# Patient Record
Sex: FEMALE | Race: WHITE | NOT HISPANIC OR LATINO | URBAN - METROPOLITAN AREA
[De-identification: names, ages, dates, MRNs, and addresses within clinical notes are randomized per-mention and may not be internally consistent; named-entity substitution may affect disease eponyms.]

---

## 2023-07-31 ENCOUNTER — EMERGENCY (EMERGENCY)
Facility: HOSPITAL | Age: 83
LOS: 1 days | Discharge: HOME | End: 2023-07-31
Attending: EMERGENCY MEDICINE | Admitting: STUDENT IN AN ORGANIZED HEALTH CARE EDUCATION/TRAINING PROGRAM
Payer: MEDICARE

## 2023-07-31 VITALS
HEART RATE: 158 BPM | OXYGEN SATURATION: 100 % | RESPIRATION RATE: 18 BRPM | DIASTOLIC BLOOD PRESSURE: 107 MMHG | SYSTOLIC BLOOD PRESSURE: 172 MMHG

## 2023-07-31 DIAGNOSIS — K86.81 EXOCRINE PANCREATIC INSUFFICIENCY: ICD-10-CM

## 2023-07-31 DIAGNOSIS — Z98.890 OTHER SPECIFIED POSTPROCEDURAL STATES: Chronic | ICD-10-CM

## 2023-07-31 DIAGNOSIS — I48.91 UNSPECIFIED ATRIAL FIBRILLATION: ICD-10-CM

## 2023-07-31 DIAGNOSIS — I10 ESSENTIAL (PRIMARY) HYPERTENSION: ICD-10-CM

## 2023-07-31 DIAGNOSIS — S72.009A FRACTURE OF UNSPECIFIED PART OF NECK OF UNSPECIFIED FEMUR, INITIAL ENCOUNTER FOR CLOSED FRACTURE: ICD-10-CM

## 2023-07-31 DIAGNOSIS — Z96.651 PRESENCE OF RIGHT ARTIFICIAL KNEE JOINT: Chronic | ICD-10-CM

## 2023-07-31 DIAGNOSIS — Z29.9 ENCOUNTER FOR PROPHYLACTIC MEASURES, UNSPECIFIED: ICD-10-CM

## 2023-07-31 DIAGNOSIS — I47.1 SUPRAVENTRICULAR TACHYCARDIA: ICD-10-CM

## 2023-07-31 DIAGNOSIS — I48.92 UNSPECIFIED ATRIAL FLUTTER: ICD-10-CM

## 2023-07-31 DIAGNOSIS — E78.5 HYPERLIPIDEMIA, UNSPECIFIED: ICD-10-CM

## 2023-07-31 DIAGNOSIS — Z90.12 ACQUIRED ABSENCE OF LEFT BREAST AND NIPPLE: Chronic | ICD-10-CM

## 2023-07-31 LAB
ALBUMIN SERPL ELPH-MCNC: 3.9 G/DL — SIGNIFICANT CHANGE UP (ref 3.3–5)
ALP SERPL-CCNC: 53 U/L — SIGNIFICANT CHANGE UP (ref 40–120)
ALT FLD-CCNC: 16 U/L — SIGNIFICANT CHANGE UP (ref 10–45)
ANION GAP SERPL CALC-SCNC: 17 MMOL/L — SIGNIFICANT CHANGE UP (ref 5–17)
APTT BLD: 30.4 SEC — SIGNIFICANT CHANGE UP (ref 24.5–35.6)
AST SERPL-CCNC: 25 U/L — SIGNIFICANT CHANGE UP (ref 10–40)
BASOPHILS # BLD AUTO: 0.02 K/UL — SIGNIFICANT CHANGE UP (ref 0–0.2)
BASOPHILS NFR BLD AUTO: 0.3 % — SIGNIFICANT CHANGE UP (ref 0–2)
BILIRUB SERPL-MCNC: 0.3 MG/DL — SIGNIFICANT CHANGE UP (ref 0.2–1.2)
BUN SERPL-MCNC: 24 MG/DL — HIGH (ref 7–23)
CALCIUM SERPL-MCNC: 9.1 MG/DL — SIGNIFICANT CHANGE UP (ref 8.4–10.5)
CHLORIDE SERPL-SCNC: 102 MMOL/L — SIGNIFICANT CHANGE UP (ref 96–108)
CO2 SERPL-SCNC: 16 MMOL/L — LOW (ref 22–31)
CREAT SERPL-MCNC: 1.08 MG/DL — SIGNIFICANT CHANGE UP (ref 0.5–1.3)
EGFR: 52 ML/MIN/1.73M2 — LOW
EOSINOPHIL # BLD AUTO: 0.01 K/UL — SIGNIFICANT CHANGE UP (ref 0–0.5)
EOSINOPHIL NFR BLD AUTO: 0.2 % — SIGNIFICANT CHANGE UP (ref 0–6)
GLUCOSE SERPL-MCNC: 174 MG/DL — HIGH (ref 70–99)
HCT VFR BLD CALC: 36.6 % — SIGNIFICANT CHANGE UP (ref 34.5–45)
HGB BLD-MCNC: 12 G/DL — SIGNIFICANT CHANGE UP (ref 11.5–15.5)
IMM GRANULOCYTES NFR BLD AUTO: 0.5 % — SIGNIFICANT CHANGE UP (ref 0–0.9)
INR BLD: 0.99 RATIO — SIGNIFICANT CHANGE UP (ref 0.85–1.18)
LYMPHOCYTES # BLD AUTO: 1.29 K/UL — SIGNIFICANT CHANGE UP (ref 1–3.3)
LYMPHOCYTES # BLD AUTO: 21.9 % — SIGNIFICANT CHANGE UP (ref 13–44)
MAGNESIUM SERPL-MCNC: 1.3 MG/DL — LOW (ref 1.6–2.6)
MCHC RBC-ENTMCNC: 30.2 PG — SIGNIFICANT CHANGE UP (ref 27–34)
MCHC RBC-ENTMCNC: 32.8 GM/DL — SIGNIFICANT CHANGE UP (ref 32–36)
MCV RBC AUTO: 92.2 FL — SIGNIFICANT CHANGE UP (ref 80–100)
MONOCYTES # BLD AUTO: 0.68 K/UL — SIGNIFICANT CHANGE UP (ref 0–0.9)
MONOCYTES NFR BLD AUTO: 11.5 % — SIGNIFICANT CHANGE UP (ref 2–14)
NEUTROPHILS # BLD AUTO: 3.86 K/UL — SIGNIFICANT CHANGE UP (ref 1.8–7.4)
NEUTROPHILS NFR BLD AUTO: 65.6 % — SIGNIFICANT CHANGE UP (ref 43–77)
NRBC # BLD: 0 /100 WBCS — SIGNIFICANT CHANGE UP (ref 0–0)
NT-PROBNP SERPL-SCNC: 1388 PG/ML — HIGH (ref 0–300)
PHOSPHATE SERPL-MCNC: 2.1 MG/DL — LOW (ref 2.5–4.5)
PLATELET # BLD AUTO: 228 K/UL — SIGNIFICANT CHANGE UP (ref 150–400)
POTASSIUM SERPL-MCNC: 4.1 MMOL/L — SIGNIFICANT CHANGE UP (ref 3.5–5.3)
POTASSIUM SERPL-SCNC: 4.1 MMOL/L — SIGNIFICANT CHANGE UP (ref 3.5–5.3)
PROT SERPL-MCNC: 6.9 G/DL — SIGNIFICANT CHANGE UP (ref 6–8.3)
PROTHROM AB SERPL-ACNC: 10.9 SEC — SIGNIFICANT CHANGE UP (ref 9.5–13)
RBC # BLD: 3.97 M/UL — SIGNIFICANT CHANGE UP (ref 3.8–5.2)
RBC # FLD: 13.1 % — SIGNIFICANT CHANGE UP (ref 10.3–14.5)
SODIUM SERPL-SCNC: 135 MMOL/L — SIGNIFICANT CHANGE UP (ref 135–145)
TROPONIN T, HIGH SENSITIVITY RESULT: 21 NG/L — SIGNIFICANT CHANGE UP (ref 0–51)
WBC # BLD: 5.89 K/UL — SIGNIFICANT CHANGE UP (ref 3.8–10.5)
WBC # FLD AUTO: 5.89 K/UL — SIGNIFICANT CHANGE UP (ref 3.8–10.5)

## 2023-07-31 RX ORDER — HEPARIN SODIUM 5000 [USP'U]/ML
6500 INJECTION INTRAVENOUS; SUBCUTANEOUS EVERY 6 HOURS
Refills: 0 | Status: DISCONTINUED | OUTPATIENT
Start: 2023-07-31 | End: 2023-08-01

## 2023-07-31 RX ORDER — LANOLIN ALCOHOL/MO/W.PET/CERES
3 CREAM (GRAM) TOPICAL AT BEDTIME
Refills: 0 | Status: DISCONTINUED | OUTPATIENT
Start: 2023-07-31 | End: 2023-08-01

## 2023-07-31 RX ORDER — METOPROLOL TARTRATE 50 MG
25 TABLET ORAL ONCE
Refills: 0 | Status: COMPLETED | OUTPATIENT
Start: 2023-07-31 | End: 2023-07-31

## 2023-07-31 RX ORDER — PANTOPRAZOLE SODIUM 20 MG/1
40 TABLET, DELAYED RELEASE ORAL
Refills: 0 | Status: DISCONTINUED | OUTPATIENT
Start: 2023-07-31 | End: 2023-08-01

## 2023-07-31 RX ORDER — MAGNESIUM SULFATE 500 MG/ML
2 VIAL (ML) INJECTION ONCE
Refills: 0 | Status: COMPLETED | OUTPATIENT
Start: 2023-07-31 | End: 2023-07-31

## 2023-07-31 RX ORDER — ONDANSETRON 8 MG/1
4 TABLET, FILM COATED ORAL EVERY 8 HOURS
Refills: 0 | Status: DISCONTINUED | OUTPATIENT
Start: 2023-07-31 | End: 2023-08-01

## 2023-07-31 RX ORDER — LIPASE/PROTEASE/AMYLASE 16-48-48K
1 CAPSULE,DELAYED RELEASE (ENTERIC COATED) ORAL
Refills: 0 | Status: DISCONTINUED | OUTPATIENT
Start: 2023-07-31 | End: 2023-08-01

## 2023-07-31 RX ORDER — ATORVASTATIN CALCIUM 80 MG/1
20 TABLET, FILM COATED ORAL AT BEDTIME
Refills: 0 | Status: DISCONTINUED | OUTPATIENT
Start: 2023-07-31 | End: 2023-08-01

## 2023-07-31 RX ORDER — METOPROLOL TARTRATE 50 MG
5 TABLET ORAL ONCE
Refills: 0 | Status: COMPLETED | OUTPATIENT
Start: 2023-07-31 | End: 2023-07-31

## 2023-07-31 RX ORDER — ACETAMINOPHEN 500 MG
650 TABLET ORAL EVERY 6 HOURS
Refills: 0 | Status: DISCONTINUED | OUTPATIENT
Start: 2023-07-31 | End: 2023-08-01

## 2023-07-31 RX ORDER — LISINOPRIL 2.5 MG/1
10 TABLET ORAL DAILY
Refills: 0 | Status: DISCONTINUED | OUTPATIENT
Start: 2023-07-31 | End: 2023-08-01

## 2023-07-31 RX ORDER — ASPIRIN/CALCIUM CARB/MAGNESIUM 324 MG
81 TABLET ORAL DAILY
Refills: 0 | Status: DISCONTINUED | OUTPATIENT
Start: 2023-07-31 | End: 2023-08-01

## 2023-07-31 RX ORDER — METOPROLOL TARTRATE 50 MG
50 TABLET ORAL DAILY
Refills: 0 | Status: DISCONTINUED | OUTPATIENT
Start: 2023-07-31 | End: 2023-08-01

## 2023-07-31 RX ORDER — HEPARIN SODIUM 5000 [USP'U]/ML
6500 INJECTION INTRAVENOUS; SUBCUTANEOUS ONCE
Refills: 0 | Status: COMPLETED | OUTPATIENT
Start: 2023-07-31 | End: 2023-07-31

## 2023-07-31 RX ORDER — SODIUM CHLORIDE 9 MG/ML
1000 INJECTION INTRAMUSCULAR; INTRAVENOUS; SUBCUTANEOUS
Refills: 0 | Status: DISCONTINUED | OUTPATIENT
Start: 2023-07-31 | End: 2023-08-01

## 2023-07-31 RX ORDER — ESCITALOPRAM OXALATE 10 MG/1
10 TABLET, FILM COATED ORAL DAILY
Refills: 0 | Status: DISCONTINUED | OUTPATIENT
Start: 2023-07-31 | End: 2023-08-01

## 2023-07-31 RX ORDER — HEPARIN SODIUM 5000 [USP'U]/ML
INJECTION INTRAVENOUS; SUBCUTANEOUS
Qty: 25000 | Refills: 0 | Status: DISCONTINUED | OUTPATIENT
Start: 2023-07-31 | End: 2023-08-01

## 2023-07-31 RX ORDER — SENNA PLUS 8.6 MG/1
2 TABLET ORAL AT BEDTIME
Refills: 0 | Status: DISCONTINUED | OUTPATIENT
Start: 2023-07-31 | End: 2023-08-01

## 2023-07-31 RX ORDER — HEPARIN SODIUM 5000 [USP'U]/ML
3000 INJECTION INTRAVENOUS; SUBCUTANEOUS EVERY 6 HOURS
Refills: 0 | Status: DISCONTINUED | OUTPATIENT
Start: 2023-07-31 | End: 2023-08-01

## 2023-07-31 RX ADMIN — Medication 25 GRAM(S): at 20:11

## 2023-07-31 RX ADMIN — HEPARIN SODIUM 1500 UNIT(S)/HR: 5000 INJECTION INTRAVENOUS; SUBCUTANEOUS at 20:10

## 2023-07-31 RX ADMIN — Medication 50 MILLIGRAM(S): at 23:43

## 2023-07-31 RX ADMIN — Medication 5 MILLIGRAM(S): at 18:25

## 2023-07-31 RX ADMIN — ATORVASTATIN CALCIUM 20 MILLIGRAM(S): 80 TABLET, FILM COATED ORAL at 23:43

## 2023-07-31 RX ADMIN — HEPARIN SODIUM 6500 UNIT(S): 5000 INJECTION INTRAVENOUS; SUBCUTANEOUS at 20:09

## 2023-07-31 RX ADMIN — Medication 85 MILLIMOLE(S): at 22:15

## 2023-07-31 RX ADMIN — Medication 25 MILLIGRAM(S): at 18:50

## 2023-07-31 NOTE — ED ADULT NURSE REASSESSMENT NOTE - NS ED NURSE REASSESS COMMENT FT1
Report received from previous RN. Pt is A&Ox4, follows commands, breathing spontaneous and unlabored on RA, NSR on cardiac monitor. Pt denies SOB, palpitations, cp, abd pain, N&v, weakness, dizziness, and HA. No complaints to endorse. Pt awaiting anticoagulation medication. Comfort and safety measures in place.

## 2023-07-31 NOTE — ED PROVIDER NOTE - DIFFERENTIAL DIAGNOSIS
Ddx includes, however, is not limited to: aflutter, afib, dehydration, electrolyte abnormality, ACS, other Differential Diagnosis

## 2023-07-31 NOTE — ED ADULT NURSE NOTE - NSFALLUNIVINTERV_ED_ALL_ED
Bed/Stretcher in lowest position, wheels locked, appropriate side rails in place/Call bell, personal items and telephone in reach/Instruct patient to call for assistance before getting out of bed/chair/stretcher/Non-slip footwear applied when patient is off stretcher/[place] to call system/Physically safe environment - no spills, clutter or unnecessary equipment/Purposeful proactive rounding/Room/bathroom lighting operational, light cord in reach

## 2023-07-31 NOTE — ED ADULT NURSE NOTE - OBJECTIVE STATEMENT
82y female, AAOx4, PMH HTN, on daily asprin, pt reports not feeling well x 2 days, some mild diarrhea, poor PO, EMS found pt to be SVT in 170s, gave pt 700 cc bolus, pt denies chest pain, shortness of breath, abd pain, n/v/d, moves all extremities w/+ pulses, 18g right ac by ems, 20g right wrist, pt ind at baseline, domiciled with sister, pt on cm, placed in gown, side rails up, comfort and safety provided.

## 2023-07-31 NOTE — H&P ADULT - NSHPPHYSICALEXAM_GEN_ALL_CORE
T(C): 37.1 (07-31-23 @ 19:30), Max: 37.1 (07-31-23 @ 19:30)  HR: 69 (07-31-23 @ 19:30) (69 - 158)  BP: 137/77 (07-31-23 @ 19:30) (137/77 - 172/107)  RR: 18 (07-31-23 @ 19:30) (17 - 18)  SpO2: 100% (07-31-23 @ 19:30) (98% - 100%)    CONSTITUTIONAL: Well groomed, no apparent distress  EYES: PERRLA and symmetric, EOMI  ENMT: MMM. Normal dentition  RESP: No respiratory distress, no use of accessory muscles; CTA b/l, no WRR  CV: +S1S2, irregular rhythm, no MRG; no peripheral edema  GI: Soft, NTND, no RGR; no palpable masses  MSK: Normal gait; normal pain free ROM x4 extremities   SKIN: No rashes or ulcers noted  NEURO: CN II-XII grossly intact; normal reflexes, sensation intact throughout   PSYCH: Appropriate insight/judgment; A+O x 3, mood and affect appropriate

## 2023-07-31 NOTE — H&P ADULT - PROBLEM SELECTOR PLAN 5
- DVT ppx: on Heparin gtt    - GI ppx: PPI   - Diet: DASH  - PT consult - c/w Statin  - Check lipid panel   - Also check A1c, per pt recent A1c was 6.3

## 2023-07-31 NOTE — ED PROVIDER NOTE - NS ED MD DISPO SPECIAL CONSIDERATION1
Pt presented to the ED c/o retrosternal CP. Reported that it was located retro sternally and radiated to her neck and jaw as well as into her epigastrium and eventually moving to her upper back. Has a PMH of DM, HTN and HLD. Is a current smoker. Pt is scheduled for a LHC with possible PCI later today. Pt lives in a two-story home alone; has family and community supports nearby. Denies SOB. Is on RA and denies home oxygen use. Is independent with her ADLs. Is able to drive herself. Denies DME use. Denies HH/STR. Denies D/A. PCP confirmed. Insurance verified and able to afford her meds. No d/c needs identified but will remain available. Care Management Interventions  PCP Verified by CM: Yes ([facility])  Mode of Transport at Discharge:  Other (see comment) (Family)  Transition of Care Consult (CM Consult): Discharge Planning  Support Systems: Other Family Member(s), Friend/Neighbor, [employer]onden[employer]/[place] [employer]  Confirm Follow Up Transport: Family  The Plan for Transition of Care is Related to the Following Treatment Goals : Return home and back to his baseline  Discharge Location  Discharge Placement: Home None

## 2023-07-31 NOTE — ED PROVIDER NOTE - PHYSICAL EXAMINATION
GEN: Patient awake alert NAD.   HEENT: normocephalic, atraumatic, moist MM  CARDIAC: tachycardic, S1, S2, no murmur.   PULM: CTA B/L no wheeze, rhonchi, rales.   ABD: soft NT, ND, no rebound no guarding  MSK: Moving all extremities, no edema.  NEURO: A&Ox3, no focal neurological deficits  SKIN: warm, dry, no rash.

## 2023-07-31 NOTE — H&P ADULT - NSICDXPASTMEDICALHX_GEN_ALL_CORE_FT
PAST MEDICAL HISTORY:  Breast cancer     Exocrine pancreatic insufficiency     GERD (gastroesophageal reflux disease)     HLD (hyperlipidemia)     HTN (hypertension)     Macular degeneration

## 2023-07-31 NOTE — H&P ADULT - HISTORY OF PRESENT ILLNESS
82y F pmh htn, hld, GERD, pancreatic enzyme deficiency, GERD, pre-DM, Macular Degeneration, breast CA s/p left Mastectomy (1990) presents for c/o palpitations. Pt endorses she was not  feeling well since last week Thursday and experienced sx of fatigue, HA, sore throat, myalgia, dry cough, ( sx much improved now). As a result of malaise, she did not eat/drink much or take her meds. She didn't take her Creon before having some meals these past few days and subsequently had diarrhea for the last 3days. She also felt so tired last night she did not take her Metroprolol (usu takes it nightly). Today she experienced palpitation, but denied CP, SOB. Palpitation was so uncomfortable she call EMS. In EMS pt was noted to HR 170s w/ runs of SVT, BP remained normal.     Cardio: Dr Tristin Almeida @ [facility]     ROS: Denies CP, SOB, N/V/D, fever, cough, chills, dizziness, abm pain, recent travel, sick contact    A 10-system ROS was performed and is negative except as noted above and/or in the HPI.

## 2023-07-31 NOTE — ED PROVIDER NOTE - OBJECTIVE STATEMENT
82-year-old female past medical history of hypertension, hyperlipidemia presents the ED complaining of palpitations.  Patient's had diarrhea for last 3 days.  Heart rate with EMS was 170 with occasional runs of NSVT with normal blood pressure normal mentation.  Denies chest pain, shortness of breath, lightheadedness, dizziness, abdominal pain, vomiting, dysuria.

## 2023-07-31 NOTE — H&P ADULT - NSICDXPASTSURGICALHX_GEN_ALL_CORE_FT
PAST SURGICAL HISTORY:  H/O left mastectomy     History of hysteroscopy     Status post right knee replacement

## 2023-07-31 NOTE — H&P ADULT - NSHPLABSRESULTS_GEN_ALL_CORE
12.0   5.89  )-----------( 228      ( 31 Jul 2023 18:53 )             36.6       07-31    135  |  102  |  24<H>  ----------------------------<  174<H>  4.1   |  16<L>  |  1.08    Ca    9.1      31 Jul 2023 18:53  Phos  2.1     07-31  Mg     1.3     07-31    TPro  6.9  /  Alb  3.9  /  TBili  0.3  /  DBili  x   /  AST  25  /  ALT  16  /  AlkPhos  53  07-31      Troponin T, High Sensitivity Result: 21:  (07.31.23 @ 18:53)  Pro-Brain Natriuretic Peptide: 1388 pg/mL (07.31.23 @ 18:53)    - - - - - - - - - - - - - - - - - - - - - - - - - - - - - - - - - - - - - - - - - - - - - - - - - - - -       EKG personally reviewed:  #1: SVT + PVC @ 158 bpm   #2 Afib/ Aflutter 70s -70s bpm     Images personally reviewed:   < from: Xray Chest 1 View- PORTABLE-Urgent (07.31.23 @ 19:25) >  INTERPRETATION:  no focal consolidations

## 2023-07-31 NOTE — H&P ADULT - PROBLEM SELECTOR PLAN 1
Presents for palpitation, denies CP, SOB  Initially with SVT + PVC, given IV & PO lopressor in ED with improvement in HR  Subsequently noted to have  Afib/Flutter on EKG, HR 80-90s   - Labs: cbc wnl, chem-- High BG, hypo Mg & Phos, trop 21, BNP elevated   - CXR: clear   - S/p  IV lopressor 5mg & PO lopressor 25mg >> HR now 80s on beside monitor still in Afib   - Started on heparin gtt in ED, will c/w   - c/w home PO lopressor    - Tele monitor   - Trend labs, replete electrolytes accordingly  - Check TSH   - Echo   - Cardio consulted in ED, f/u rec Presents for palpitation, denies CP, SOB  Initially with SVT + PVC, given IV & PO lopressor in ED with improvement in HR  Subsequently noted to have  Afib/Flutter on EKG, HR 80-90s   - Labs: cbc wnl, chem-- High BG, hypo Mg & Phos, trop 21, BNP elevated   - CXR: clear   - S/p  IV lopressor 5mg & PO lopressor 25mg >> HR now 80s on beside monitor still in Afib   - Started on heparin gtt in ED, will c/w   - c/w home PO lopressor    - Tele monitor   - Trend labs, replete electrolytes accordingly  - Check TSH   - Echo   - Cardio consulted in ED, f/u rec    Follows Cardio: Dr Tristin Almeida @ [facility]

## 2023-07-31 NOTE — ED ADULT NURSE REASSESSMENT NOTE - NS ED NURSE REASSESS COMMENT FT1
Heparin bolus given and infusion started with 2nd RN at bedside. Actual weight documented and 2 patent IV's in place. Risks and benefits discussed with patient. Comfort and safety measures in place

## 2023-07-31 NOTE — H&P ADULT - ASSESSMENT
82y F pmh htn, hld, GERD, pancreatic enzyme deficiency, GERD, pre-DM, Macular Degeneration, breast CA s/p left Mastectomy (1990) presents for c/o palpitations found to be in SVT s/p intervention with improvement in rate then noted to have Afib/Flutter, being admitted for further cardiac eval

## 2023-07-31 NOTE — ED PROVIDER NOTE - CLINICAL SUMMARY MEDICAL DECISION MAKING FREE TEXT BOX
Avery Beckett,  PGY-3: 82-year-old female past medical history of hypertension, hyperlipidemia presents the ED complaining of palpitations.  Patient's had diarrhea for last 3 days.  Heart rate with EMS was 170 with occasional runs of NSVT with normal blood pressure normal mentation.  Denies chest pain, shortness of breath, lightheadedness, dizziness, abdominal pain, vomiting, dysuria. Patient's initial heart rate in the 150s regular on the monitor concern for SVT, normal blood pressure normal mentation.  Patient given 5 of Lopressor IV after vagal maneuvers failed.  Patient slowed down and repeat rhythm is now a flutter with variable conduction.  Patient was then given 25 p.o.  Differential includes not limited to electrolyte abnormality, conduction abnormality, cardiac event, infection, dehydration.  Plan for labs, IV fluids, EP consult. Avery Beckett,  PGY-3: 82-year-old female past medical history of hypertension, hyperlipidemia presents the ED complaining of palpitations.  Patient's had diarrhea for last 3 days.  Heart rate with EMS was 170 with occasional runs of NSVT with normal blood pressure normal mentation.  Denies chest pain, shortness of breath, lightheadedness, dizziness, abdominal pain, vomiting, dysuria. Patient's initial heart rate in the 150s regular on the monitor concern for SVT, normal blood pressure normal mentation.  Patient given 5 of Lopressor IV after vagal maneuvers failed.  Patient slowed down and repeat rhythm is now a flutter with variable conduction.  Patient was then given 25 p.o.  Differential includes not limited to electrolyte abnormality, conduction abnormality, cardiac event, infection, dehydration.  Plan for labs, IV fluids, EP consult.    AILEEN Shore MD: Agree with resident/ACP MDM, assessment and plan as above. Pt arrived with regular narrow HR in 150-160's. Vagal maneuvers attempted at bedside without improvement in HR. Pt given IV lopressor 5mg at bedside for -160, HR improved to 80's, Aflutter with variable block

## 2023-08-01 ENCOUNTER — TRANSCRIPTION ENCOUNTER (OUTPATIENT)
Age: 83
End: 2023-08-01

## 2023-08-01 VITALS
HEART RATE: 64 BPM | OXYGEN SATURATION: 97 % | TEMPERATURE: 98 F | SYSTOLIC BLOOD PRESSURE: 117 MMHG | DIASTOLIC BLOOD PRESSURE: 72 MMHG | RESPIRATION RATE: 18 BRPM

## 2023-08-01 LAB
A1C WITH ESTIMATED AVERAGE GLUCOSE RESULT: 6.8 % — HIGH (ref 4–5.6)
ANION GAP SERPL CALC-SCNC: 16 MMOL/L — SIGNIFICANT CHANGE UP (ref 5–17)
APTT BLD: 82.5 SEC — HIGH (ref 24.5–35.6)
APTT BLD: >200 SEC — CRITICAL HIGH (ref 24.5–35.6)
BUN SERPL-MCNC: 22 MG/DL — SIGNIFICANT CHANGE UP (ref 7–23)
CALCIUM SERPL-MCNC: 8.8 MG/DL — SIGNIFICANT CHANGE UP (ref 8.4–10.5)
CHLORIDE SERPL-SCNC: 104 MMOL/L — SIGNIFICANT CHANGE UP (ref 96–108)
CHOLEST SERPL-MCNC: 135 MG/DL — SIGNIFICANT CHANGE UP
CO2 SERPL-SCNC: 19 MMOL/L — LOW (ref 22–31)
CREAT SERPL-MCNC: 1.03 MG/DL — SIGNIFICANT CHANGE UP (ref 0.5–1.3)
EGFR: 55 ML/MIN/1.73M2 — LOW
ESTIMATED AVERAGE GLUCOSE: 148 MG/DL — HIGH (ref 68–114)
GLUCOSE SERPL-MCNC: 134 MG/DL — HIGH (ref 70–99)
HCT VFR BLD CALC: 31.9 % — LOW (ref 34.5–45)
HCT VFR BLD CALC: 33 % — LOW (ref 34.5–45)
HDLC SERPL-MCNC: 39 MG/DL — LOW
HGB BLD-MCNC: 10.9 G/DL — LOW (ref 11.5–15.5)
HGB BLD-MCNC: 11 G/DL — LOW (ref 11.5–15.5)
LIPID PNL WITH DIRECT LDL SERPL: 65 MG/DL — SIGNIFICANT CHANGE UP
MCHC RBC-ENTMCNC: 30.3 PG — SIGNIFICANT CHANGE UP (ref 27–34)
MCHC RBC-ENTMCNC: 31.1 PG — SIGNIFICANT CHANGE UP (ref 27–34)
MCHC RBC-ENTMCNC: 33.3 GM/DL — SIGNIFICANT CHANGE UP (ref 32–36)
MCHC RBC-ENTMCNC: 34.2 GM/DL — SIGNIFICANT CHANGE UP (ref 32–36)
MCV RBC AUTO: 90.9 FL — SIGNIFICANT CHANGE UP (ref 80–100)
MCV RBC AUTO: 90.9 FL — SIGNIFICANT CHANGE UP (ref 80–100)
NON HDL CHOLESTEROL: 97 MG/DL — SIGNIFICANT CHANGE UP
NRBC # BLD: 0 /100 WBCS — SIGNIFICANT CHANGE UP (ref 0–0)
NRBC # BLD: 0 /100 WBCS — SIGNIFICANT CHANGE UP (ref 0–0)
PLATELET # BLD AUTO: 235 K/UL — SIGNIFICANT CHANGE UP (ref 150–400)
PLATELET # BLD AUTO: 239 K/UL — SIGNIFICANT CHANGE UP (ref 150–400)
POTASSIUM SERPL-MCNC: 4.2 MMOL/L — SIGNIFICANT CHANGE UP (ref 3.5–5.3)
POTASSIUM SERPL-SCNC: 4.2 MMOL/L — SIGNIFICANT CHANGE UP (ref 3.5–5.3)
RBC # BLD: 3.51 M/UL — LOW (ref 3.8–5.2)
RBC # BLD: 3.63 M/UL — LOW (ref 3.8–5.2)
RBC # FLD: 13.2 % — SIGNIFICANT CHANGE UP (ref 10.3–14.5)
RBC # FLD: 13.2 % — SIGNIFICANT CHANGE UP (ref 10.3–14.5)
SODIUM SERPL-SCNC: 139 MMOL/L — SIGNIFICANT CHANGE UP (ref 135–145)
TRIGL SERPL-MCNC: 187 MG/DL — HIGH
TSH SERPL-MCNC: 2.07 UIU/ML — SIGNIFICANT CHANGE UP (ref 0.27–4.2)
WBC # BLD: 4.78 K/UL — SIGNIFICANT CHANGE UP (ref 3.8–10.5)
WBC # BLD: 5.06 K/UL — SIGNIFICANT CHANGE UP (ref 3.8–10.5)
WBC # FLD AUTO: 4.78 K/UL — SIGNIFICANT CHANGE UP (ref 3.8–10.5)
WBC # FLD AUTO: 5.06 K/UL — SIGNIFICANT CHANGE UP (ref 3.8–10.5)

## 2023-08-01 PROCEDURE — 36415 COLL VENOUS BLD VENIPUNCTURE: CPT

## 2023-08-01 PROCEDURE — 76376 3D RENDER W/INTRP POSTPROCES: CPT

## 2023-08-01 PROCEDURE — 99285 EMERGENCY DEPT VISIT HI MDM: CPT | Mod: 25

## 2023-08-01 PROCEDURE — 85025 COMPLETE CBC W/AUTO DIFF WBC: CPT

## 2023-08-01 PROCEDURE — 93306 TTE W/DOPPLER COMPLETE: CPT

## 2023-08-01 PROCEDURE — 80061 LIPID PANEL: CPT

## 2023-08-01 PROCEDURE — 85610 PROTHROMBIN TIME: CPT

## 2023-08-01 PROCEDURE — 96374 THER/PROPH/DIAG INJ IV PUSH: CPT

## 2023-08-01 PROCEDURE — 93005 ELECTROCARDIOGRAM TRACING: CPT | Mod: 76

## 2023-08-01 PROCEDURE — 80048 BASIC METABOLIC PNL TOTAL CA: CPT

## 2023-08-01 PROCEDURE — 96375 TX/PRO/DX INJ NEW DRUG ADDON: CPT

## 2023-08-01 PROCEDURE — 84100 ASSAY OF PHOSPHORUS: CPT

## 2023-08-01 PROCEDURE — 80053 COMPREHEN METABOLIC PANEL: CPT

## 2023-08-01 PROCEDURE — 71045 X-RAY EXAM CHEST 1 VIEW: CPT

## 2023-08-01 PROCEDURE — 85027 COMPLETE CBC AUTOMATED: CPT

## 2023-08-01 PROCEDURE — 83036 HEMOGLOBIN GLYCOSYLATED A1C: CPT

## 2023-08-01 PROCEDURE — 84443 ASSAY THYROID STIM HORMONE: CPT

## 2023-08-01 PROCEDURE — 84484 ASSAY OF TROPONIN QUANT: CPT

## 2023-08-01 PROCEDURE — 83735 ASSAY OF MAGNESIUM: CPT

## 2023-08-01 PROCEDURE — 83880 ASSAY OF NATRIURETIC PEPTIDE: CPT

## 2023-08-01 PROCEDURE — 85730 THROMBOPLASTIN TIME PARTIAL: CPT

## 2023-08-01 RX ORDER — MAGNESIUM SULFATE 500 MG/ML
4 VIAL (ML) INJECTION ONCE
Refills: 0 | Status: DISCONTINUED | OUTPATIENT
Start: 2023-08-01 | End: 2023-08-01

## 2023-08-01 RX ORDER — SENNA PLUS 8.6 MG/1
2 TABLET ORAL
Qty: 60 | Refills: 0
Start: 2023-08-01 | End: 2023-08-30

## 2023-08-01 RX ORDER — APIXABAN 2.5 MG/1
1 TABLET, FILM COATED ORAL
Qty: 60 | Refills: 0
Start: 2023-08-01 | End: 2023-08-30

## 2023-08-01 RX ORDER — MAGNESIUM SULFATE 500 MG/ML
2 VIAL (ML) INJECTION
Refills: 0 | Status: COMPLETED | OUTPATIENT
Start: 2023-08-01 | End: 2023-08-01

## 2023-08-01 RX ORDER — ENOXAPARIN SODIUM 100 MG/ML
80 INJECTION SUBCUTANEOUS EVERY 12 HOURS
Refills: 0 | Status: DISCONTINUED | OUTPATIENT
Start: 2023-08-01 | End: 2023-08-01

## 2023-08-01 RX ADMIN — HEPARIN SODIUM 1200 UNIT(S)/HR: 5000 INJECTION INTRAVENOUS; SUBCUTANEOUS at 04:42

## 2023-08-01 RX ADMIN — Medication 25 GRAM(S): at 12:30

## 2023-08-01 RX ADMIN — Medication 81 MILLIGRAM(S): at 12:29

## 2023-08-01 RX ADMIN — HEPARIN SODIUM 0 UNIT(S)/HR: 5000 INJECTION INTRAVENOUS; SUBCUTANEOUS at 03:34

## 2023-08-01 RX ADMIN — Medication 1 TABLET(S): at 12:29

## 2023-08-01 RX ADMIN — LISINOPRIL 10 MILLIGRAM(S): 2.5 TABLET ORAL at 08:33

## 2023-08-01 RX ADMIN — Medication 1 CAPSULE(S): at 08:33

## 2023-08-01 RX ADMIN — PANTOPRAZOLE SODIUM 40 MILLIGRAM(S): 20 TABLET, DELAYED RELEASE ORAL at 06:06

## 2023-08-01 RX ADMIN — Medication 1 CAPSULE(S): at 12:30

## 2023-08-01 RX ADMIN — Medication 1 TABLET(S): at 12:30

## 2023-08-01 NOTE — PROGRESS NOTE ADULT - ASSESSMENT
82y F pmh htn, hld, GERD, pancreatic enzyme deficiency, GERD, pre-DM, Macular Degeneration, breast CA s/p left Mastectomy (1990) presents for c/o palpitations found to be in SVT s/p intervention with improvement in rate then noted to have Afib/Flutter, being admitted for further cardiac eval  Statement Selected

## 2023-08-01 NOTE — DISCHARGE NOTE PROVIDER - NSDCFUADDAPPT_GEN_ALL_CORE_FT
Follow up with PMD within 3 days of discharge.  Follows Cardiologist: Dr Tristin Almeida @ [facility]- Follow up in 5 days of discharge.

## 2023-08-01 NOTE — DISCHARGE NOTE PROVIDER - PROVIDER TOKENS
FREE:[LAST:[Dr Alicia Ayers],PHONE:[(   )    -],FAX:[(   )    -]],FREE:[LAST:[Dr Tristin Almeida @ [facility]],PHONE:[(   )    -],FAX:[(   )    -]]

## 2023-08-01 NOTE — CONSULT NOTE ADULT - SUBJECTIVE AND OBJECTIVE BOX
Patient seen and evaluated at bedside    Chief Complaint:    HPI:  82y F pmh HTN, HLD, pre-DM, ?A. fib, GERD, pancreatic deficiency, Macular Degeneration, breast CA s/p left Mastectomy (1990) presents for c/o palpitations. Patient who follows regularly with a cardiologist in [place], [place] when she began to have URI symptoms. She had malaise and limited appetite and so had limited PO intake and due to her fatigue did not take her metoprolol the night prior to ED presentation. Later that night she began experiencing palpitations that did not resolve and so she presented to the ED. She denies a history of palpitations and had no other associated symptoms including CP, SOB, or light-headedness (aside from a brief episode of light-headedness the morning of ED presentation that resolved She has no limitations in ET.  She reports that her PCP obtained a routine EKG about 4-5 years ago that was c/f A. fib and so she was started on Eliquis. She took eliquis for less than a week as her cardiologist repeated her EKG and reports she was told she had "borderline a. fib".     In the ED, patient was noted to be in a.fib with RVR, with rates to 150s with stable BP. She was given IV lopressor 5 mg followed by 25 mg PO with improvement of HR to 70-90s and complete resolution of her palpitations.     Cardio: Dr Tristin Almeida @ [facility]     PMHx:   HTN (hypertension)  HLD (hyperlipidemia)  GERD (gastroesophageal reflux disease)  Breast cancer  Macular degeneration  Exocrine pancreatic insufficiency      PSHx:   H/O left mastectomy  History of hysteroscopy  Status post right knee replacement      Allergies:  penicillin (Angioedema)    Home Meds:  Toprol XL 50 mg daily  Lisinopril 10 mg  Atorvastatin 20 mg  HCTZ 12.5 mg  ASA 81 mg daily  Creon     Current Medications:   acetaminophen     Tablet .. 650 milliGRAM(s) Oral every 6 hours PRN  aluminum hydroxide/magnesium hydroxide/simethicone Suspension 30 milliLiter(s) Oral every 4 hours PRN  aspirin  chewable 81 milliGRAM(s) Oral daily  atorvastatin 20 milliGRAM(s) Oral at bedtime  calcium carbonate 1250 mG  + Vitamin D (OsCal 500 + D) 1 Tablet(s) Oral daily  escitalopram 10 milliGRAM(s) Oral daily  heparin   Injectable 3000 Unit(s) IV Push every 6 hours PRN  heparin   Injectable 6500 Unit(s) IV Push every 6 hours PRN  heparin  Infusion.  Unit(s)/Hr IV Continuous <Continuous>  hydrochlorothiazide 12.5 milliGRAM(s) Oral daily  lisinopril 10 milliGRAM(s) Oral daily  melatonin 3 milliGRAM(s) Oral at bedtime PRN  metoprolol succinate ER 50 milliGRAM(s) Oral daily  multivitamin 1 Tablet(s) Oral daily  ondansetron Injectable 4 milliGRAM(s) IV Push every 8 hours PRN  pancrelipase  (CREON 24,000 Lipase Units) 1 Capsule(s) Oral three times a day with meals  pantoprazole    Tablet 40 milliGRAM(s) Oral before breakfast  senna 2 Tablet(s) Oral at bedtime  sodium chloride 0.9%. 1000 milliLiter(s) IV Continuous <Continuous>      FAMILY HISTORY:  AF in siblings     REVIEW OF SYSTEMS:  All other review of systems is negative unless indicated above.    Physical Exam:  T(F): 98.2 (08-01), Max: 98.7 (07-31)  HR: 70 (08-01) (66 - 158)  BP: 150/80 (08-01) (124/66 - 172/107)  RR: 18 (08-01)  SpO2: 97% (08-01)  GENERAL: No acute distress, well-developed  HEAD:  Atraumatic, Normocephalic  ENT: EOMI, No JVD, moist mucosa  CHEST/LUNG: Clear to auscultation bilaterally; No wheeze, equal breath sounds bilaterally   BACK: No spinal tenderness  HEART: Regular rate and irregular rhythm  ABDOMEN: Soft, Nontender, Nondistended; Bowel sounds present  EXTREMITIES:  No clubbing, cyanosis, or edema  PSYCH: Nl behavior, nl affect  NEUROLOGY: AAOx3, non-focal, cranial nerves intact  SKIN: Normal color, No rashes or lesions      Cardiovascular Diagnostic Testing:    ECG: Personally reviewed:  Atrial fib with controlled ventricular rate       Echo: none on record      Labs: Personally reviewed                        12.0   5.89  )-----------( 228      ( 31 Jul 2023 18:53 )             36.6     07-31    135  |  102  |  24<H>  ----------------------------<  174<H>  4.1   |  16<L>  |  1.08    Ca    9.1      31 Jul 2023 18:53  Phos  2.1     07-31  Mg     1.3     07-31    TPro  6.9  /  Alb  3.9  /  TBili  0.3  /  DBili  x   /  AST  25  /  ALT  16  /  AlkPhos  53  07-31    PT/INR - ( 31 Jul 2023 18:53 )   PT: 10.9 sec;   INR: 0.99 ratio         PTT - ( 31 Jul 2023 18:53 )  PTT:30.4 sec    CARDIAC MARKERS ( 31 Jul 2023 18:53 )  21 ng/L / x     / x     / x     / x     / x

## 2023-08-01 NOTE — DISCHARGE NOTE PROVIDER - NSDCMRMEDTOKEN_GEN_ALL_CORE_FT
Aspir 81 oral delayed release tablet: 1 orally once a day  calcium (as carbonate)-vitamin D 500 mg-400 intl units (10 mcg) oral tablet: 1 orally  Centrum Chewables Adults oral tablet, chewable: 1 chewed once a day  Eliquis 5 mg oral tablet: 1 tab(s) orally 2 times a day  hydroCHLOROthiazide 12.5 mg oral capsule: 1 orally once a day  Lexapro 10 mg oral tablet: 1 orally once a day  Lipitor 20 mg oral tablet: 1 orally  lisinopril 10 mg oral tablet: 1 orally once a day  metoprolol succinate 50 mg oral tablet, extended release: 1 orally once a day (at bedtime)  omeprazole 10 mg oral delayed release capsule: 1 orally once a day  pancrelipase 24,000 units-76,000 units-120,000 units oral delayed release capsule: 1 orally 3 times a day with meals  PreserVision AREDS Lutein oral capsule: orally once a day  senna leaf extract oral tablet: 2 tab(s) orally once a day (at bedtime)

## 2023-08-01 NOTE — PROGRESS NOTE ADULT - PROBLEM SELECTOR PLAN 6
- DVT ppx: full dose eliquis  - GI ppx: PPI   - Diet: DASH      dispo: home today with OP cardiologyo f/u later this week. Can defer PT eval as pt was ambulatory prior to arrival.  plan discussed with and agreed on by pt    total discharge time: 46 minutes.

## 2023-08-01 NOTE — DISCHARGE NOTE PROVIDER - HOSPITAL COURSE
82y F pmh htn, hld, GERD, pancreatic enzyme deficiency, GERD, pre-DM, Macular Degeneration, breast CA s/p left Mastectomy (1990) presents for c/o palpitations found to be in SVT s/p intervention with improvement in rate then noted to have Afib/Flutter, being admitted for further cardiac eval       > Atrial fibrillation and flutter.   ·  Plan: Presents for palpitation, denies CP, SOB  Initially with SVT + PVC, given IV & PO lopressor in ED with improvement in HR  Subsequently noted to have  Afib/Flutter on EKG, HR 80-90s, started on heparin gtt  8/1- back in NSR per EKG, pt asymptomatic  discussed with cardiology who in turn d/w OP cardiologist- pt cleared for dc today, can f/u with OP cards this week for further management  cont metop succ 50 daily  switch heparin to Eliquis 5mg po bid( sent to [facility]  [facility])    Follows Cardiologist : Dr Tristin Almeida @ [facility].    >  SVT (supraventricular tachycardia).   ·  Plan: See above.    > HTN (hypertension).   ·  Plan: - Metoprolol XL 50  - Lisinopril 10  - HTZ 12.5.    > Exocrine pancreatic insufficiency.   ·  Plan: - C/w creon.    > HLD (hyperlipidemia).   ·  Plan: - c/w Statin  - Check lipid panel   - Also check A1c, per pt recent A1c was 6.3.    >  Prophylactic measure.   ·  Plan: - DVT ppx: full dose eliquis  - GI ppx: PPI   - Diet: DASH      Cleared by Dr Parr for  home today with outpatient cardiology f/u later this week. Can defer PT eval as pt was ambulatory prior to arrival.  plan discussed with and agreed on by pt

## 2023-08-01 NOTE — PROGRESS NOTE ADULT - PROBLEM SELECTOR PLAN 1
Presents for palpitation, denies CP, SOB  Initially with SVT + PVC, given IV & PO lopressor in ED with improvement in HR  Subsequently noted to have  Afib/Flutter on EKG, HR 80-90s, started on heparin gtt  8/1- back in NSR per EKG, pt asymptomatic  discussed with cardiology who in turn d/w OP cardiologist- pt cleared for dc today, can f/u with OP cards this week for further management  cont metop succ 50 daily  switch heparin to eliquis 5mg po bid    Follows Cardio: Dr Tristin Almeida @ [facility]

## 2023-08-01 NOTE — DISCHARGE NOTE PROVIDER - NSDCCONDITION_GEN_ALL_CORE
Stable
acetaminophen   Tablet .. PRN  ascorbic acid  dexamethasone     Tablet  dextrose 40% Gel PRN  dextrose 5%.  dextrose 50% Injectable  dextrose 50% Injectable  dextrose 50% Injectable  enoxaparin Injectable  finasteride  glucagon  Injectable PRN  insulin glargine Injectable (LANTUS)  insulin lispro (HumaLOG) corrective regimen sliding scale  insulin lispro (HumaLOG) corrective regimen sliding scale  lacosamide  levETIRAcetam  lisinopril  multivitamin  nystatin Powder  pantoprazole    Tablet  senna PRN  zinc sulfate

## 2023-08-01 NOTE — PROGRESS NOTE ADULT - SUBJECTIVE AND OBJECTIVE BOX
Dash Parr MD  Division of Hospital Medicine  Available on MS teams until 7pm  If no response or off-hours, page [phone]  -------------------------------------    Patient is a 82y old  Female who presents with a chief complaint of palpitation, PAF (01 Aug 2023 09:22)      SUBJECTIVE / OVERNIGHT EVENTS: none acute  ADDITIONAL REVIEW OF SYSTEMS: pt feels better, no palpitations. no other new symptoms.     MEDICATIONS  (STANDING):  aspirin  chewable 81 milliGRAM(s) Oral daily  atorvastatin 20 milliGRAM(s) Oral at bedtime  calcium carbonate 1250 mG  + Vitamin D (OsCal 500 + D) 1 Tablet(s) Oral daily  escitalopram 10 milliGRAM(s) Oral daily  hydrochlorothiazide 12.5 milliGRAM(s) Oral daily  lisinopril 10 milliGRAM(s) Oral daily  magnesium sulfate  IVPB 2 Gram(s) IV Intermittent every 2 hours  metoprolol succinate ER 50 milliGRAM(s) Oral daily  multivitamin 1 Tablet(s) Oral daily  pancrelipase  (CREON 24,000 Lipase Units) 1 Capsule(s) Oral three times a day with meals  pantoprazole    Tablet 40 milliGRAM(s) Oral before breakfast  senna 2 Tablet(s) Oral at bedtime  sodium chloride 0.9%. 1000 milliLiter(s) (75 mL/Hr) IV Continuous <Continuous>    MEDICATIONS  (PRN):  acetaminophen     Tablet .. 650 milliGRAM(s) Oral every 6 hours PRN Temp greater or equal to 38C (100.4F), Mild Pain (1 - 3)  aluminum hydroxide/magnesium hydroxide/simethicone Suspension 30 milliLiter(s) Oral every 4 hours PRN Dyspepsia  melatonin 3 milliGRAM(s) Oral at bedtime PRN Insomnia  ondansetron Injectable 4 milliGRAM(s) IV Push every 8 hours PRN Nausea and/or Vomiting      CAPILLARY BLOOD GLUCOSE        I&O's Summary      PHYSICAL EXAM:  Vital Signs Last 24 Hrs  T(C): 36.8 (01 Aug 2023 08:20), Max: 37.1 (31 Jul 2023 19:30)  T(F): 98.2 (01 Aug 2023 08:20), Max: 98.7 (31 Jul 2023 19:30)  HR: 66 (01 Aug 2023 08:20) (66 - 158)  BP: 155/71 (01 Aug 2023 08:20) (124/66 - 172/107)  BP(mean): 85 (31 Jul 2023 23:30) (85 - 85)  RR: 18 (01 Aug 2023 08:20) (17 - 18)  SpO2: 95% (01 Aug 2023 08:20) (95% - 100%)    Parameters below as of 01 Aug 2023 08:20  Patient On (Oxygen Delivery Method): room air      CONSTITUTIONAL: NAD  EYES: PERRLA; conjunctiva and sclera clear  ENMT: MMM  NECK: Supple  RESPIRATORY: Normal respiratory effort; CTAB  CARDIOVASCULAR: RRR, no JVD, no peripheral edema   ABDOMEN: Nontender to palpation, normoactive BS, no guarding/rigidity  MUSCLOSKELETAL:  no clubbing/cyanosis, no joint swelling or tenderness to palpation  PSYCH: A+O x 3, affect normal  NEUROLOGY: CN 2-12 are intact and symmetric; no gross sensory or motor deficits  SKIN: No rashes; no palpable lesions    LABS:                        11.0   4.78  )-----------( 235      ( 01 Aug 2023 02:00 )             33.0     07-31    135  |  102  |  24<H>  ----------------------------<  174<H>  4.1   |  16<L>  |  1.08    Ca    9.1      31 Jul 2023 18:53  Phos  2.1     07-31  Mg     1.3     07-31    TPro  6.9  /  Alb  3.9  /  TBili  0.3  /  DBili  x   /  AST  25  /  ALT  16  /  AlkPhos  53  07-31    PT/INR - ( 31 Jul 2023 18:53 )   PT: 10.9 sec;   INR: 0.99 ratio         PTT - ( 01 Aug 2023 02:00 )  PTT:>200.0 sec      Urinalysis Basic - ( 31 Jul 2023 18:53 )    Color: x / Appearance: x / SG: x / pH: x  Gluc: 174 mg/dL / Ketone: x  / Bili: x / Urobili: x   Blood: x / Protein: x / Nitrite: x   Leuk Esterase: x / RBC: x / WBC x   Sq Epi: x / Non Sq Epi: x / Bacteria: x          RADIOLOGY & ADDITIONAL TESTS:  Results Reviewed:   Imaging Personally Reviewed:  Electrocardiogram Personally Reviewed:    COORDINATION OF CARE:  Care Discussed with Consultants/Other Providers [Y/N]: cardiology attg  Prior or Outpatient Records Reviewed [Y/N]:

## 2023-08-01 NOTE — DISCHARGE NOTE PROVIDER - NSDCCPCAREPLAN_GEN_ALL_CORE_FT
PRINCIPAL DISCHARGE DIAGNOSIS  Diagnosis: Atrial fibrillation and flutter  Assessment and Plan of Treatment: Atrial fibrillation is the most common heart rhythm problem.  The condition puts you at risk for has stroke and heart attack  It helps if you control your blood pressure, not drink more than 1-2 alcohol drinks per day, cut down on caffeine, getting treatment for over active thyroid gland, and get regular exercise  Call your doctor if you feel your heart racing or beating unusually, chest tightness or pain, lightheaded, faint, shortness of breath especially with exercise  It is important to take your heart medication as prescribed  You may be on anticoagulation which is very important to take as directed - you may need blood work to monitor drug levels        SECONDARY DISCHARGE DIAGNOSES  Diagnosis: SVT (supraventricular tachycardia)  Assessment and Plan of Treatment: Resolved    Diagnosis: HLD (hyperlipidemia)  Assessment and Plan of Treatment: Continue home medications    Diagnosis: HTN (hypertension)  Assessment and Plan of Treatment: Continue home medications

## 2023-08-01 NOTE — PROGRESS NOTE ADULT - SUBJECTIVE AND OBJECTIVE BOX
SUBJ:  feeling better  82 y.o with ? of PAF,  never identified by outside cardiologist.  was briefly on eliquis several years ago.  presented with palp, tachycardia for more than 1 day.  under stress at home,  ELIZABETH avelarx.. missed her metoprolol.  presented with afib with RVR  now feels much better..    Home Medications:  Aspir 81 oral delayed release tablet: 1 orally once a day (31 Jul 2023 21:57)  calcium (as carbonate)-vitamin D 500 mg-400 intl units (10 mcg) oral tablet: 1 orally (31 Jul 2023 21:57)  Centrum Chewables Adults oral tablet, chewable: 1 chewed once a day (31 Jul 2023 21:57)  hydroCHLOROthiazide 12.5 mg oral capsule: 1 orally once a day (31 Jul 2023 21:57)  Lexapro 10 mg oral tablet: 1 orally once a day (31 Jul 2023 21:57)  Lipitor 20 mg oral tablet: 1 orally (31 Jul 2023 21:58)  lisinopril 10 mg oral tablet: 1 orally once a day (31 Jul 2023 21:57)  metoprolol succinate 50 mg oral tablet, extended release: 1 orally once a day (at bedtime) (31 Jul 2023 21:57)  omeprazole 10 mg oral delayed release capsule: 1 orally once a day (31 Jul 2023 21:57)  pancrelipase 24,000 units-76,000 units-120,000 units oral delayed release capsule: 1 orally 3 times a day with meals (31 Jul 2023 21:57)  PreserVision AREDS Lutein oral capsule: orally once a day (31 Jul 2023 21:57)      MEDICATIONS  (STANDING):  aspirin  chewable 81 milliGRAM(s) Oral daily  atorvastatin 20 milliGRAM(s) Oral at bedtime  calcium carbonate 1250 mG  + Vitamin D (OsCal 500 + D) 1 Tablet(s) Oral daily  escitalopram 10 milliGRAM(s) Oral daily  heparin  Infusion.  Unit(s)/Hr (15 mL/Hr) IV Continuous <Continuous>  hydrochlorothiazide 12.5 milliGRAM(s) Oral daily  lisinopril 10 milliGRAM(s) Oral daily  metoprolol succinate ER 50 milliGRAM(s) Oral daily  multivitamin 1 Tablet(s) Oral daily  pancrelipase  (CREON 24,000 Lipase Units) 1 Capsule(s) Oral three times a day with meals  pantoprazole    Tablet 40 milliGRAM(s) Oral before breakfast  senna 2 Tablet(s) Oral at bedtime  sodium chloride 0.9%. 1000 milliLiter(s) (75 mL/Hr) IV Continuous <Continuous>    MEDICATIONS  (PRN):  acetaminophen     Tablet .. 650 milliGRAM(s) Oral every 6 hours PRN Temp greater or equal to 38C (100.4F), Mild Pain (1 - 3)  aluminum hydroxide/magnesium hydroxide/simethicone Suspension 30 milliLiter(s) Oral every 4 hours PRN Dyspepsia  heparin   Injectable 6500 Unit(s) IV Push every 6 hours PRN For aPTT less than 40  heparin   Injectable 3000 Unit(s) IV Push every 6 hours PRN For aPTT between 40 - 57  melatonin 3 milliGRAM(s) Oral at bedtime PRN Insomnia  ondansetron Injectable 4 milliGRAM(s) IV Push every 8 hours PRN Nausea and/or Vomiting      Vital Signs Last 24 Hrs  T(C): 36.8 (01 Aug 2023 08:20), Max: 37.1 (31 Jul 2023 19:30)  T(F): 98.2 (01 Aug 2023 08:20), Max: 98.7 (31 Jul 2023 19:30)  HR: 66 (01 Aug 2023 08:20) (66 - 158)  BP: 155/71 (01 Aug 2023 08:20) (124/66 - 172/107)  BP(mean): 85 (31 Jul 2023 23:30) (85 - 85)  RR: 18 (01 Aug 2023 08:20) (17 - 18)  SpO2: 95% (01 Aug 2023 08:20) (95% - 100%)    Parameters below as of 01 Aug 2023 08:20  Patient On (Oxygen Delivery Method): room air        REVIEW OF SYSTEMS:  As per HPI, otherwise unremarkable.     PHYSICAL EXAM:  Constitutional/Appearance: Normal, Well-developed  HEENT:   Normal oral mucosa, no drainage or redness, supple neck  Lymphatic: No lymphadenopathy  Cardiovascular: Normal S1 S2, No edema, RRR, I/VI systol to apex.  Respiratory: Lungs clear to auscultation, respirations non-labored  Psychiatry: A & O x 3, appropriate affect.   Gastrointestinal:  Soft, Non-tender, no distention  Skin: No rashes, No ecchymoses, No cyanosis	  Neurologic: Non-focal, Alert and oriented x 3  Extremities: Normal range of motion  Vascular: Peripheral pulses palpable 2+ bilaterally (radial)    LABS:  CBC Full  -  ( 01 Aug 2023 02:00 )  WBC Count : 4.78 K/uL  RBC Count : 3.63 M/uL  Hemoglobin : 11.0 g/dL  Hematocrit : 33.0 %  Platelet Count - Automated : 235 K/uL  Mean Cell Volume : 90.9 fl  Mean Cell Hemoglobin : 30.3 pg  Mean Cell Hemoglobin Concentration : 33.3 gm/dL  Auto Neutrophil # : x  Auto Lymphocyte # : x  Auto Monocyte # : x  Auto Eosinophil # : x  Auto Basophil # : x  Auto Neutrophil % : x  Auto Lymphocyte % : x  Auto Monocyte % : x  Auto Eosinophil % : x  Auto Basophil % : x      07-31    135  |  102  |  24<H>  ----------------------------<  174<H>  4.1   |  16<L>  |  1.08    Ca    9.1      31 Jul 2023 18:53  Phos  2.1     07-31  Mg     1.3     07-31    TPro  6.9  /  Alb  3.9  /  TBili  0.3  /  DBili  x   /  AST  25  /  ALT  16  /  AlkPhos  53  07-31            RADIOLOGY & ADDITIONAL STUDIES:  TELEMETRY:  ECG: morning ecg pending  TTE: pending     IMPRESSION AND PLAN:    ***  hx of poss PAF,  presented with afib with RVR , treated with beta blocker.. currently on heparin.  echo this am, needs ecg.  will speak with her cardiologist in [place] re: additional hx...  will follow back after the echo.       SUBJ:  feeling better  82 y.o with ? of PAF,  never identified by outside cardiologist.  was briefly on eliquis several years ago.  presented with palp, tachycardia for more than 1 day.  under stress at home,  ELIZABETH avelarx.. missed her metoprolol.  presented with afib with RVR  now feels much better..    Home Medications:  Aspir 81 oral delayed release tablet: 1 orally once a day (31 Jul 2023 21:57)  calcium (as carbonate)-vitamin D 500 mg-400 intl units (10 mcg) oral tablet: 1 orally (31 Jul 2023 21:57)  Centrum Chewables Adults oral tablet, chewable: 1 chewed once a day (31 Jul 2023 21:57)  hydroCHLOROthiazide 12.5 mg oral capsule: 1 orally once a day (31 Jul 2023 21:57)  Lexapro 10 mg oral tablet: 1 orally once a day (31 Jul 2023 21:57)  Lipitor 20 mg oral tablet: 1 orally (31 Jul 2023 21:58)  lisinopril 10 mg oral tablet: 1 orally once a day (31 Jul 2023 21:57)  metoprolol succinate 50 mg oral tablet, extended release: 1 orally once a day (at bedtime) (31 Jul 2023 21:57)  omeprazole 10 mg oral delayed release capsule: 1 orally once a day (31 Jul 2023 21:57)  pancrelipase 24,000 units-76,000 units-120,000 units oral delayed release capsule: 1 orally 3 times a day with meals (31 Jul 2023 21:57)  PreserVision AREDS Lutein oral capsule: orally once a day (31 Jul 2023 21:57)      MEDICATIONS  (STANDING):  aspirin  chewable 81 milliGRAM(s) Oral daily  atorvastatin 20 milliGRAM(s) Oral at bedtime  calcium carbonate 1250 mG  + Vitamin D (OsCal 500 + D) 1 Tablet(s) Oral daily  escitalopram 10 milliGRAM(s) Oral daily  heparin  Infusion.  Unit(s)/Hr (15 mL/Hr) IV Continuous <Continuous>  hydrochlorothiazide 12.5 milliGRAM(s) Oral daily  lisinopril 10 milliGRAM(s) Oral daily  metoprolol succinate ER 50 milliGRAM(s) Oral daily  multivitamin 1 Tablet(s) Oral daily  pancrelipase  (CREON 24,000 Lipase Units) 1 Capsule(s) Oral three times a day with meals  pantoprazole    Tablet 40 milliGRAM(s) Oral before breakfast  senna 2 Tablet(s) Oral at bedtime  sodium chloride 0.9%. 1000 milliLiter(s) (75 mL/Hr) IV Continuous <Continuous>    MEDICATIONS  (PRN):  acetaminophen     Tablet .. 650 milliGRAM(s) Oral every 6 hours PRN Temp greater or equal to 38C (100.4F), Mild Pain (1 - 3)  aluminum hydroxide/magnesium hydroxide/simethicone Suspension 30 milliLiter(s) Oral every 4 hours PRN Dyspepsia  heparin   Injectable 6500 Unit(s) IV Push every 6 hours PRN For aPTT less than 40  heparin   Injectable 3000 Unit(s) IV Push every 6 hours PRN For aPTT between 40 - 57  melatonin 3 milliGRAM(s) Oral at bedtime PRN Insomnia  ondansetron Injectable 4 milliGRAM(s) IV Push every 8 hours PRN Nausea and/or Vomiting      Vital Signs Last 24 Hrs  T(C): 36.8 (01 Aug 2023 08:20), Max: 37.1 (31 Jul 2023 19:30)  T(F): 98.2 (01 Aug 2023 08:20), Max: 98.7 (31 Jul 2023 19:30)  HR: 66 (01 Aug 2023 08:20) (66 - 158)  BP: 155/71 (01 Aug 2023 08:20) (124/66 - 172/107)  BP(mean): 85 (31 Jul 2023 23:30) (85 - 85)  RR: 18 (01 Aug 2023 08:20) (17 - 18)  SpO2: 95% (01 Aug 2023 08:20) (95% - 100%)    Parameters below as of 01 Aug 2023 08:20  Patient On (Oxygen Delivery Method): room air        REVIEW OF SYSTEMS:  As per HPI, otherwise unremarkable.     PHYSICAL EXAM:  Constitutional/Appearance: Normal, Well-developed  HEENT:   Normal oral mucosa, no drainage or redness, supple neck  Lymphatic: No lymphadenopathy  Cardiovascular: Normal S1 S2, No edema, RRR, I/VI systol to apex.  Respiratory: Lungs clear to auscultation, respirations non-labored  Psychiatry: A & O x 3, appropriate affect.   Gastrointestinal:  Soft, Non-tender, no distention  Skin: No rashes, No ecchymoses, No cyanosis	  Neurologic: Non-focal, Alert and oriented x 3  Extremities: Normal range of motion  Vascular: Peripheral pulses palpable 2+ bilaterally (radial)    LABS:  CBC Full  -  ( 01 Aug 2023 02:00 )  WBC Count : 4.78 K/uL  RBC Count : 3.63 M/uL  Hemoglobin : 11.0 g/dL  Hematocrit : 33.0 %  Platelet Count - Automated : 235 K/uL  Mean Cell Volume : 90.9 fl  Mean Cell Hemoglobin : 30.3 pg  Mean Cell Hemoglobin Concentration : 33.3 gm/dL  Auto Neutrophil # : x  Auto Lymphocyte # : x  Auto Monocyte # : x  Auto Eosinophil # : x  Auto Basophil # : x  Auto Neutrophil % : x  Auto Lymphocyte % : x  Auto Monocyte % : x  Auto Eosinophil % : x  Auto Basophil % : x      07-31    135  |  102  |  24<H>  ----------------------------<  174<H>  4.1   |  16<L>  |  1.08    Ca    9.1      31 Jul 2023 18:53  Phos  2.1     07-31  Mg     1.3     07-31    TPro  6.9  /  Alb  3.9  /  TBili  0.3  /  DBili  x   /  AST  25  /  ALT  16  /  AlkPhos  53  07-31            RADIOLOGY & ADDITIONAL STUDIES:  TELEMETRY:  ECG: morning ecg pending  TTE: pending     IMPRESSION AND PLAN:    ***  hx of poss PAF,  presented with afib with RVR , treated with beta blocker.. currently on heparin.  echo this am, needs ecg.  will speak with her cardiologist in [place] re: additional hx...  will follow back after the echo.      ADD:  echo reviewed.. nml lv, RV with no signif valve disease or pericardial effusion.  Pt is back in NSR... discussed with her cardiologist and he provided us with records... there is a discrepancy between the dose of metoprolol she is receivitng fron her PCP and the dose the cardiologist note states... I have told her to clarify this with her once she is out...  Would d/c heparin and start eliquis 5 mg bid. Can D/C home on same dose of beta blocker.. pt to see her cardiologist by end of the week..  if unable then I have given her my card to see me next week...  discussed with med team.

## 2023-08-01 NOTE — DISCHARGE NOTE NURSING/CASE MANAGEMENT/SOCIAL WORK - NSDCPEFALRISK_GEN_ALL_CORE
For information on Fall & Injury Prevention, visit: [email or web address] OR  [email or web address] OR  https://www.cdc.gov/steadi/patient.html

## 2023-08-01 NOTE — PROGRESS NOTE ADULT - NSPROGADDITIONALINFOA_GEN_ALL_CORE
Based on my assessment, this patient’s condition has improved and no longer warrants inpatient status and will be changed to outpatient status prior to being discharged from the hospital.  This decision is based on the following reasons:   - pt reverted back to NSR on ekg  - cleared by cardiology for dc without furtehr inpatient interventions indicated  - safe dc plan in place

## 2023-08-01 NOTE — DISCHARGE NOTE NURSING/CASE MANAGEMENT/SOCIAL WORK - NSDCFUADDAPPT_GEN_ALL_CORE_FT
Follow up with PMD within 3 days of discharge.  Follows Cardiologist: Dr Tristin Almeida @ [facility]- Follow up in 5 days of discharge.

## 2023-08-01 NOTE — CONSULT NOTE ADULT - ASSESSMENT
82y F pmh HTN, HLD, pre-DM, ?A. fib, GERD, pancreatic deficiency, Macular Degeneration, breast CA s/p left Mastectomy (1990) presents for c/o palpitations, noted to be in symptomatic A. fib with RVR. Patients history is c/w likely chronic a. fib that has been rate controlled with toprol XL and her current presentation was in likely in s/o acute URI, dehydration, and missed dose of her beta blocker. Now that her rates are well controlled, her symptoms have abated.     #A. fib  - Continue home Torpol XL 50 mg daily   - Patient was initiated on heparin gtt in ED given uncertainly of a. fib chronicity and possible need for cardioversion. Further history should be obtained from patient's cardiologist regarding chronicity of her a. fib, however, her history is c/w likely chronic a. fib in which case she can likely be transitioned to DOAC such as Eliquis. [id number] - 4 making her a candidate for AC.   - Correct electrolyte derangements Mg >1.8, Phos >2.5  - TTE    #HLD  - Continue home statin and ASA    #HTN  - Cotninue home lisinopril 10 mg and Hctz 12.5 mg as BP tolerates

## 2023-08-01 NOTE — DISCHARGE NOTE NURSING/CASE MANAGEMENT/SOCIAL WORK - PATIENT PORTAL LINK FT
You can access the FollowMyHealth Patient Portal offered by [facility] by registering at the following website: [email or web address]. By joining [facility]’s FollowMyHealth portal, you will also be able to view your health information using other applications (apps) compatible with our system.

## 2023-08-01 NOTE — DISCHARGE NOTE PROVIDER - CARE PROVIDER_API CALL
Dr Alicia Ayers,   Phone: (   )    -  Fax: (   )    -  Follow Up Time:     Dr Tristin Almeida @ [facility],   Phone: (   )    -  Fax: (   )    -  Follow Up Time:

## 2023-08-01 NOTE — PROGRESS NOTE ADULT - PROBLEM/PLAN-4
DISPLAY PLAN FREE TEXT [Chest Pain] : chest pain [Fever] : no fever [Chills] : no chills [Recent Change In Weight] : ~T no recent weight change [Lower Ext Edema] : no lower extremity edema [Shortness Of Breath] : no shortness of breath [Abdominal Pain] : no abdominal pain [Diarrhea] : diarrhea [Constipation] : no constipation

## 2023-12-03 ENCOUNTER — INPATIENT (INPATIENT)
Facility: HOSPITAL | Age: 83
LOS: 2 days | Discharge: NURSING, REHAB OR LONG-TERM CARE | DRG: 522 | End: 2023-12-06
Attending: GENERAL ACUTE CARE HOSPITAL | Admitting: GENERAL ACUTE CARE HOSPITAL
Payer: MEDICARE

## 2023-12-03 ENCOUNTER — TRANSCRIPTION ENCOUNTER (OUTPATIENT)
Age: 83
End: 2023-12-03

## 2023-12-03 VITALS
RESPIRATION RATE: 18 BRPM | TEMPERATURE: 98 F | DIASTOLIC BLOOD PRESSURE: 81 MMHG | OXYGEN SATURATION: 88 % | HEART RATE: 75 BPM | SYSTOLIC BLOOD PRESSURE: 195 MMHG

## 2023-12-03 DIAGNOSIS — Z98.890 OTHER SPECIFIED POSTPROCEDURAL STATES: Chronic | ICD-10-CM

## 2023-12-03 DIAGNOSIS — Z96.651 PRESENCE OF RIGHT ARTIFICIAL KNEE JOINT: Chronic | ICD-10-CM

## 2023-12-03 DIAGNOSIS — Z90.12 ACQUIRED ABSENCE OF LEFT BREAST AND NIPPLE: Chronic | ICD-10-CM

## 2023-12-03 DIAGNOSIS — S72.009A FRACTURE OF UNSPECIFIED PART OF NECK OF UNSPECIFIED FEMUR, INITIAL ENCOUNTER FOR CLOSED FRACTURE: ICD-10-CM

## 2023-12-03 LAB
ALBUMIN SERPL ELPH-MCNC: 4.1 G/DL — SIGNIFICANT CHANGE UP (ref 3.3–5)
ALBUMIN SERPL ELPH-MCNC: 4.1 G/DL — SIGNIFICANT CHANGE UP (ref 3.3–5)
ALP SERPL-CCNC: 66 U/L — SIGNIFICANT CHANGE UP (ref 40–120)
ALP SERPL-CCNC: 66 U/L — SIGNIFICANT CHANGE UP (ref 40–120)
ALT FLD-CCNC: 23 U/L — SIGNIFICANT CHANGE UP (ref 10–45)
ALT FLD-CCNC: 23 U/L — SIGNIFICANT CHANGE UP (ref 10–45)
ANION GAP SERPL CALC-SCNC: 13 MMOL/L — SIGNIFICANT CHANGE UP (ref 5–17)
ANION GAP SERPL CALC-SCNC: 13 MMOL/L — SIGNIFICANT CHANGE UP (ref 5–17)
APTT BLD: 31.8 SEC — SIGNIFICANT CHANGE UP (ref 24.5–35.6)
APTT BLD: 31.8 SEC — SIGNIFICANT CHANGE UP (ref 24.5–35.6)
AST SERPL-CCNC: 31 U/L — SIGNIFICANT CHANGE UP (ref 10–40)
AST SERPL-CCNC: 31 U/L — SIGNIFICANT CHANGE UP (ref 10–40)
BASOPHILS # BLD AUTO: 0.06 K/UL — SIGNIFICANT CHANGE UP (ref 0–0.2)
BASOPHILS # BLD AUTO: 0.06 K/UL — SIGNIFICANT CHANGE UP (ref 0–0.2)
BASOPHILS NFR BLD AUTO: 0.8 % — SIGNIFICANT CHANGE UP (ref 0–2)
BASOPHILS NFR BLD AUTO: 0.8 % — SIGNIFICANT CHANGE UP (ref 0–2)
BILIRUB SERPL-MCNC: 0.3 MG/DL — SIGNIFICANT CHANGE UP (ref 0.2–1.2)
BILIRUB SERPL-MCNC: 0.3 MG/DL — SIGNIFICANT CHANGE UP (ref 0.2–1.2)
BLD GP AB SCN SERPL QL: NEGATIVE — SIGNIFICANT CHANGE UP
BLD GP AB SCN SERPL QL: NEGATIVE — SIGNIFICANT CHANGE UP
BUN SERPL-MCNC: 26 MG/DL — HIGH (ref 7–23)
BUN SERPL-MCNC: 26 MG/DL — HIGH (ref 7–23)
CALCIUM SERPL-MCNC: 9.5 MG/DL — SIGNIFICANT CHANGE UP (ref 8.4–10.5)
CALCIUM SERPL-MCNC: 9.5 MG/DL — SIGNIFICANT CHANGE UP (ref 8.4–10.5)
CHLORIDE SERPL-SCNC: 104 MMOL/L — SIGNIFICANT CHANGE UP (ref 96–108)
CHLORIDE SERPL-SCNC: 104 MMOL/L — SIGNIFICANT CHANGE UP (ref 96–108)
CO2 SERPL-SCNC: 22 MMOL/L — SIGNIFICANT CHANGE UP (ref 22–31)
CO2 SERPL-SCNC: 22 MMOL/L — SIGNIFICANT CHANGE UP (ref 22–31)
CREAT SERPL-MCNC: 1.15 MG/DL — SIGNIFICANT CHANGE UP (ref 0.5–1.3)
CREAT SERPL-MCNC: 1.15 MG/DL — SIGNIFICANT CHANGE UP (ref 0.5–1.3)
EGFR: 48 ML/MIN/1.73M2 — LOW
EGFR: 48 ML/MIN/1.73M2 — LOW
EOSINOPHIL # BLD AUTO: 0.19 K/UL — SIGNIFICANT CHANGE UP (ref 0–0.5)
EOSINOPHIL # BLD AUTO: 0.19 K/UL — SIGNIFICANT CHANGE UP (ref 0–0.5)
EOSINOPHIL NFR BLD AUTO: 2.4 % — SIGNIFICANT CHANGE UP (ref 0–6)
EOSINOPHIL NFR BLD AUTO: 2.4 % — SIGNIFICANT CHANGE UP (ref 0–6)
GLUCOSE SERPL-MCNC: 174 MG/DL — HIGH (ref 70–99)
GLUCOSE SERPL-MCNC: 174 MG/DL — HIGH (ref 70–99)
HCT VFR BLD CALC: 32.8 % — LOW (ref 34.5–45)
HCT VFR BLD CALC: 32.8 % — LOW (ref 34.5–45)
HGB BLD-MCNC: 10.9 G/DL — LOW (ref 11.5–15.5)
HGB BLD-MCNC: 10.9 G/DL — LOW (ref 11.5–15.5)
IMM GRANULOCYTES NFR BLD AUTO: 0.6 % — SIGNIFICANT CHANGE UP (ref 0–0.9)
IMM GRANULOCYTES NFR BLD AUTO: 0.6 % — SIGNIFICANT CHANGE UP (ref 0–0.9)
INR BLD: 1.22 RATIO — HIGH (ref 0.85–1.18)
INR BLD: 1.22 RATIO — HIGH (ref 0.85–1.18)
LYMPHOCYTES # BLD AUTO: 2.17 K/UL — SIGNIFICANT CHANGE UP (ref 1–3.3)
LYMPHOCYTES # BLD AUTO: 2.17 K/UL — SIGNIFICANT CHANGE UP (ref 1–3.3)
LYMPHOCYTES # BLD AUTO: 27.3 % — SIGNIFICANT CHANGE UP (ref 13–44)
LYMPHOCYTES # BLD AUTO: 27.3 % — SIGNIFICANT CHANGE UP (ref 13–44)
MCHC RBC-ENTMCNC: 31 PG — SIGNIFICANT CHANGE UP (ref 27–34)
MCHC RBC-ENTMCNC: 31 PG — SIGNIFICANT CHANGE UP (ref 27–34)
MCHC RBC-ENTMCNC: 33.2 GM/DL — SIGNIFICANT CHANGE UP (ref 32–36)
MCHC RBC-ENTMCNC: 33.2 GM/DL — SIGNIFICANT CHANGE UP (ref 32–36)
MCV RBC AUTO: 93.2 FL — SIGNIFICANT CHANGE UP (ref 80–100)
MCV RBC AUTO: 93.2 FL — SIGNIFICANT CHANGE UP (ref 80–100)
MONOCYTES # BLD AUTO: 0.65 K/UL — SIGNIFICANT CHANGE UP (ref 0–0.9)
MONOCYTES # BLD AUTO: 0.65 K/UL — SIGNIFICANT CHANGE UP (ref 0–0.9)
MONOCYTES NFR BLD AUTO: 8.2 % — SIGNIFICANT CHANGE UP (ref 2–14)
MONOCYTES NFR BLD AUTO: 8.2 % — SIGNIFICANT CHANGE UP (ref 2–14)
NEUTROPHILS # BLD AUTO: 4.83 K/UL — SIGNIFICANT CHANGE UP (ref 1.8–7.4)
NEUTROPHILS # BLD AUTO: 4.83 K/UL — SIGNIFICANT CHANGE UP (ref 1.8–7.4)
NEUTROPHILS NFR BLD AUTO: 60.7 % — SIGNIFICANT CHANGE UP (ref 43–77)
NEUTROPHILS NFR BLD AUTO: 60.7 % — SIGNIFICANT CHANGE UP (ref 43–77)
NRBC # BLD: 0 /100 WBCS — SIGNIFICANT CHANGE UP (ref 0–0)
NRBC # BLD: 0 /100 WBCS — SIGNIFICANT CHANGE UP (ref 0–0)
PLATELET # BLD AUTO: 244 K/UL — SIGNIFICANT CHANGE UP (ref 150–400)
PLATELET # BLD AUTO: 244 K/UL — SIGNIFICANT CHANGE UP (ref 150–400)
POTASSIUM SERPL-MCNC: 4.1 MMOL/L — SIGNIFICANT CHANGE UP (ref 3.5–5.3)
POTASSIUM SERPL-MCNC: 4.1 MMOL/L — SIGNIFICANT CHANGE UP (ref 3.5–5.3)
POTASSIUM SERPL-SCNC: 4.1 MMOL/L — SIGNIFICANT CHANGE UP (ref 3.5–5.3)
POTASSIUM SERPL-SCNC: 4.1 MMOL/L — SIGNIFICANT CHANGE UP (ref 3.5–5.3)
PROT SERPL-MCNC: 7.3 G/DL — SIGNIFICANT CHANGE UP (ref 6–8.3)
PROT SERPL-MCNC: 7.3 G/DL — SIGNIFICANT CHANGE UP (ref 6–8.3)
PROTHROM AB SERPL-ACNC: 13.3 SEC — HIGH (ref 9.5–13)
PROTHROM AB SERPL-ACNC: 13.3 SEC — HIGH (ref 9.5–13)
RBC # BLD: 3.52 M/UL — LOW (ref 3.8–5.2)
RBC # BLD: 3.52 M/UL — LOW (ref 3.8–5.2)
RBC # FLD: 14 % — SIGNIFICANT CHANGE UP (ref 10.3–14.5)
RBC # FLD: 14 % — SIGNIFICANT CHANGE UP (ref 10.3–14.5)
RH IG SCN BLD-IMP: POSITIVE — SIGNIFICANT CHANGE UP
RH IG SCN BLD-IMP: POSITIVE — SIGNIFICANT CHANGE UP
SODIUM SERPL-SCNC: 139 MMOL/L — SIGNIFICANT CHANGE UP (ref 135–145)
SODIUM SERPL-SCNC: 139 MMOL/L — SIGNIFICANT CHANGE UP (ref 135–145)
WBC # BLD: 7.95 K/UL — SIGNIFICANT CHANGE UP (ref 3.8–10.5)
WBC # BLD: 7.95 K/UL — SIGNIFICANT CHANGE UP (ref 3.8–10.5)
WBC # FLD AUTO: 7.95 K/UL — SIGNIFICANT CHANGE UP (ref 3.8–10.5)
WBC # FLD AUTO: 7.95 K/UL — SIGNIFICANT CHANGE UP (ref 3.8–10.5)

## 2023-12-03 PROCEDURE — 73552 X-RAY EXAM OF FEMUR 2/>: CPT | Mod: 26,LT

## 2023-12-03 PROCEDURE — 73562 X-RAY EXAM OF KNEE 3: CPT | Mod: 26,LT

## 2023-12-03 PROCEDURE — 71045 X-RAY EXAM CHEST 1 VIEW: CPT | Mod: 26

## 2023-12-03 PROCEDURE — 73502 X-RAY EXAM HIP UNI 2-3 VIEWS: CPT | Mod: 26,LT

## 2023-12-03 PROCEDURE — 99285 EMERGENCY DEPT VISIT HI MDM: CPT | Mod: GC

## 2023-12-03 RX ORDER — OXYCODONE HYDROCHLORIDE 5 MG/1
2.5 TABLET ORAL EVERY 4 HOURS
Refills: 0 | Status: DISCONTINUED | OUTPATIENT
Start: 2023-12-03 | End: 2023-12-04

## 2023-12-03 RX ORDER — AMIODARONE HYDROCHLORIDE 400 MG/1
1 TABLET ORAL
Refills: 0 | DISCHARGE

## 2023-12-03 RX ORDER — ATORVASTATIN CALCIUM 80 MG/1
20 TABLET, FILM COATED ORAL AT BEDTIME
Refills: 0 | Status: DISCONTINUED | OUTPATIENT
Start: 2023-12-03 | End: 2023-12-04

## 2023-12-03 RX ORDER — ACETAMINOPHEN 500 MG
975 TABLET ORAL EVERY 8 HOURS
Refills: 0 | Status: DISCONTINUED | OUTPATIENT
Start: 2023-12-03 | End: 2023-12-04

## 2023-12-03 RX ORDER — OXYCODONE HYDROCHLORIDE 5 MG/1
5 TABLET ORAL EVERY 4 HOURS
Refills: 0 | Status: DISCONTINUED | OUTPATIENT
Start: 2023-12-03 | End: 2023-12-04

## 2023-12-03 RX ORDER — MAGNESIUM HYDROXIDE 400 MG/1
30 TABLET, CHEWABLE ORAL DAILY
Refills: 0 | Status: DISCONTINUED | OUTPATIENT
Start: 2023-12-03 | End: 2023-12-04

## 2023-12-03 RX ORDER — MORPHINE SULFATE 50 MG/1
4 CAPSULE, EXTENDED RELEASE ORAL ONCE
Refills: 0 | Status: DISCONTINUED | OUTPATIENT
Start: 2023-12-03 | End: 2023-12-03

## 2023-12-03 RX ORDER — SENNA PLUS 8.6 MG/1
2 TABLET ORAL AT BEDTIME
Refills: 0 | Status: DISCONTINUED | OUTPATIENT
Start: 2023-12-03 | End: 2023-12-04

## 2023-12-03 RX ORDER — AMIODARONE HYDROCHLORIDE 400 MG/1
200 TABLET ORAL DAILY
Refills: 0 | Status: DISCONTINUED | OUTPATIENT
Start: 2023-12-03 | End: 2023-12-04

## 2023-12-03 RX ORDER — METOPROLOL TARTRATE 50 MG
50 TABLET ORAL DAILY
Refills: 0 | Status: DISCONTINUED | OUTPATIENT
Start: 2023-12-03 | End: 2023-12-04

## 2023-12-03 RX ORDER — LIPASE/PROTEASE/AMYLASE 16-48-48K
1 CAPSULE,DELAYED RELEASE (ENTERIC COATED) ORAL
Refills: 0 | Status: DISCONTINUED | OUTPATIENT
Start: 2023-12-03 | End: 2023-12-04

## 2023-12-03 RX ORDER — ESCITALOPRAM OXALATE 10 MG/1
10 TABLET, FILM COATED ORAL DAILY
Refills: 0 | Status: DISCONTINUED | OUTPATIENT
Start: 2023-12-03 | End: 2023-12-04

## 2023-12-03 RX ORDER — LISINOPRIL 2.5 MG/1
10 TABLET ORAL DAILY
Refills: 0 | Status: DISCONTINUED | OUTPATIENT
Start: 2023-12-03 | End: 2023-12-04

## 2023-12-03 RX ORDER — SODIUM CHLORIDE 9 MG/ML
1000 INJECTION, SOLUTION INTRAVENOUS
Refills: 0 | Status: DISCONTINUED | OUTPATIENT
Start: 2023-12-03 | End: 2023-12-04

## 2023-12-03 RX ADMIN — ATORVASTATIN CALCIUM 20 MILLIGRAM(S): 80 TABLET, FILM COATED ORAL at 23:36

## 2023-12-03 RX ADMIN — MORPHINE SULFATE 4 MILLIGRAM(S): 50 CAPSULE, EXTENDED RELEASE ORAL at 21:07

## 2023-12-03 RX ADMIN — OXYCODONE HYDROCHLORIDE 5 MILLIGRAM(S): 5 TABLET ORAL at 23:36

## 2023-12-03 NOTE — ED PROVIDER NOTE - OBJECTIVE STATEMENT
Patient is a 72-year-old female past medical history patient is an 83-year-old female past medical history A-fib on Xarelto, pancreatitis, right knee replacement presenting for fall.  Patient states prior to arrival she was walking downstairs, missed the bottom 2 steps and fell onto her left hip.  Was unable to get up with immediate left hip pain, no injury to her head or neck, no loss of consciousness no symptoms prior to fall, no vision changes, dizziness, headache, extremity numbness or tingling.  States that she can feel her left foot and move her left toes.  But unable to move her left hip because of the pain at her hip.  Did not take any analgesia prior to arrival.  Did not take any of her night meds including her Xarelto.

## 2023-12-03 NOTE — ED PROVIDER NOTE - PHYSICAL EXAMINATION
CONSTITUTIONAL: awake, appears in pain  SKIN: warm, dry, no lacerations/abrasions  HEAD: Normocephalic; atraumatic.  EYES: no conjunctival injection. PERRL.   ENT: No nasal discharge; airway clear.  NECK: Supple; non tender.  CARD: S1, S2 normal; no murmurs, gallops, or rubs. Regular rate and rhythm.   RESP: No wheezes, rales or rhonchi. Good air movement bilaterally.   ABD: soft ntnd, no guarding, no distention, no rigidity.   EXT: LLE short and externallly rotated. L hip tender to palpation. L foot good pulses, good toes sensation and ROM. L hip ROM limited.  NEURO: Alert, oriented, grossly unremarkable  PSYCH: Cooperative, appropriate.

## 2023-12-03 NOTE — ED PROVIDER NOTE - ATTENDING CONTRIBUTION TO CARE
83-year-old female with a history of A-fib on Eliquis hypertension high cholesterol presenting status post mechanical fall down 3 stairs complaining of left hip pain cannot stand or bear weight pain with heel strike mildly shortened order set used found to have a left femoral neck denies hitting her head preop was ordered, ortho consult. analgesia

## 2023-12-03 NOTE — ED ADULT NURSE NOTE - OBJECTIVE STATEMENT
82y/o female presents to ED via EMS, a&ox4, s/p mechanical fall. Patient reports she missed the last step going down to he basement. Fell onto left side, hitting head on plastic bin. Denies LOC. C/o pain to left hip. Per EMS, patient found with left lower extremity shortened and externally rotated. 50mcg Fentanyl given by EMS. patient presents to ED stating pain has improved after medication, on experiencing pain with movement. Denies HA, N/V, dizziness, cp, sob, or any other complaints. Left lower extremity remains shortened and externally rotated. No other bruising, bleeding, lacerations, abrasions or deformities noted. 84y/o female presents to ED via EMS, a&ox4, s/p mechanical fall. Patient reports she missed the last step going down to he basement. Fell onto left side, hitting head on plastic bin. Denies LOC. C/o pain to left hip. Per EMS, patient found with left lower extremity shortened and externally rotated. 50mcg Fentanyl given by EMS. patient presents to ED stating pain has improved after medication, on experiencing pain with movement. Denies HA, N/V, dizziness, cp, sob, or any other complaints. Left lower extremity remains shortened and externally rotated. No other bruising, bleeding, lacerations, abrasions or deformities noted.

## 2023-12-03 NOTE — ED ADULT NURSE NOTE - NSFALLHARMRISKINTERV_ED_ALL_ED
Assistance OOB with selected safe patient handling equipment if applicable/Assistance with ambulation/Communicate risk of Fall with Harm to all staff, patient, and family/Monitor gait and stability/Provide visual cue: red socks, yellow wristband, yellow gown, etc/Reinforce activity limits and safety measures with patient and family/Bed in lowest position, wheels locked, appropriate side rails in place/Call bell, personal items and telephone in reach/Instruct patient to call for assistance before getting out of bed/chair/stretcher/Non-slip footwear applied when patient is off stretcher/Loop to call system/Physically safe environment - no spills, clutter or unnecessary equipment/Purposeful Proactive Rounding/Room/bathroom lighting operational, light cord in reach Assistance OOB with selected safe patient handling equipment if applicable/Assistance with ambulation/Communicate risk of Fall with Harm to all staff, patient, and family/Monitor gait and stability/Provide visual cue: red socks, yellow wristband, yellow gown, etc/Reinforce activity limits and safety measures with patient and family/Bed in lowest position, wheels locked, appropriate side rails in place/Call bell, personal items and telephone in reach/Instruct patient to call for assistance before getting out of bed/chair/stretcher/Non-slip footwear applied when patient is off stretcher/[place] to call system/Physically safe environment - no spills, clutter or unnecessary equipment/Purposeful Proactive Rounding/Room/bathroom lighting operational, light cord in reach

## 2023-12-03 NOTE — H&P ADULT - HISTORY OF PRESENT ILLNESS
83yFemale hx afib on xarelto, macular degeneration, pancreatic enzyme deficiency, c/o L hip pain s/p mechanical fall. Patient denies head hit or LOC. Patient denies numbness or tingling in the LLLE. Patient denies any other injuries.    PMH:  HTN (hypertension)    HLD (hyperlipidemia)    GERD (gastroesophageal reflux disease)    Breast cancer    Macular degeneration    Exocrine pancreatic insufficiency      PSH:  H/O left mastectomy    History of hysteroscopy    Status post right knee replacement      AH:  penicillin (Angioedema)    Meds: See med rec    T(C): 36.7 (12-03-23 @ 19:49)  HR: 76 (12-03-23 @ 20:44)  BP: 176/80 (12-03-23 @ 20:44)  RR: 18 (12-03-23 @ 20:44)  SpO2: 96% (12-03-23 @ 20:44)  Wt(kg): --                        10.9   7.95  )-----------( 244      ( 03 Dec 2023 21:04 )             32.8     12-03    139  |  104  |  26<H>  ----------------------------<  174<H>  4.1   |  22  |  1.15    Ca    9.5      03 Dec 2023 21:04    TPro  7.3  /  Alb  4.1  /  TBili  0.3  /  DBili  x   /  AST  31  /  ALT  23  /  AlkPhos  66  12-03    PT/INR - ( 03 Dec 2023 21:04 )   PT: 13.3 sec;   INR: 1.22 ratio         PTT - ( 03 Dec 2023 21:04 )  PTT:31.8 sec  Urinalysis Basic - ( 03 Dec 2023 21:04 )    Color: x / Appearance: x / SG: x / pH: x  Gluc: 174 mg/dL / Ketone: x  / Bili: x / Urobili: x   Blood: x / Protein: x / Nitrite: x   Leuk Esterase: x / RBC: x / WBC x   Sq Epi: x / Non Sq Epi: x / Bacteria: x        PE  Gen: Laying in bed, alert and oriented, NAD  Resp: Unlabored breathing  LLE: Skin intact, no ecchymosis,   SILT DP/SP/ Robel/Saph/Post Tib  +EHL/FHL/TA/Gastroc,   Knee/ankle painless ROM,   hip ROM limited 2/2 pain,   DP+,   soft compartments, no calf ttp,   +log roll.    Secondary:  No TTP over bony landmarks, SILT BL, ROM intact BL, distal pulses palpable.    Imaging:  XR demonstrating with L Femoral Neck Fracture      83yFemale with L Femoral Neck Fracture. Plan for OR 12/4, L hip hemiarthroplasty    - Pain control  - IS  - Continue home medications  - NPOpMN/IVF  - CBC/BMP/Coags/UA/T+S x2  - EKG/CXR  -  Medical clearance prior to planned procedure  - Plan for OR 12/4

## 2023-12-04 DIAGNOSIS — Z01.818 ENCOUNTER FOR OTHER PREPROCEDURAL EXAMINATION: ICD-10-CM

## 2023-12-04 DIAGNOSIS — I10 ESSENTIAL (PRIMARY) HYPERTENSION: ICD-10-CM

## 2023-12-04 DIAGNOSIS — I48.20 CHRONIC ATRIAL FIBRILLATION, UNSPECIFIED: ICD-10-CM

## 2023-12-04 DIAGNOSIS — I48.0 PAROXYSMAL ATRIAL FIBRILLATION: ICD-10-CM

## 2023-12-04 DIAGNOSIS — S72.009A FRACTURE OF UNSPECIFIED PART OF NECK OF UNSPECIFIED FEMUR, INITIAL ENCOUNTER FOR CLOSED FRACTURE: ICD-10-CM

## 2023-12-04 PROBLEM — E78.5 HYPERLIPIDEMIA, UNSPECIFIED: Chronic | Status: ACTIVE | Noted: 2023-07-31

## 2023-12-04 PROBLEM — K21.9 GASTRO-ESOPHAGEAL REFLUX DISEASE WITHOUT ESOPHAGITIS: Chronic | Status: ACTIVE | Noted: 2023-07-31

## 2023-12-04 PROBLEM — C50.919 MALIGNANT NEOPLASM OF UNSPECIFIED SITE OF UNSPECIFIED FEMALE BREAST: Chronic | Status: ACTIVE | Noted: 2023-07-31

## 2023-12-04 PROBLEM — K86.81 EXOCRINE PANCREATIC INSUFFICIENCY: Chronic | Status: ACTIVE | Noted: 2023-07-31

## 2023-12-04 PROBLEM — H35.30 UNSPECIFIED MACULAR DEGENERATION: Chronic | Status: ACTIVE | Noted: 2023-07-31

## 2023-12-04 LAB
ANION GAP SERPL CALC-SCNC: 10 MMOL/L — SIGNIFICANT CHANGE UP (ref 5–17)
ANION GAP SERPL CALC-SCNC: 10 MMOL/L — SIGNIFICANT CHANGE UP (ref 5–17)
ANION GAP SERPL CALC-SCNC: 14 MMOL/L — SIGNIFICANT CHANGE UP (ref 5–17)
ANION GAP SERPL CALC-SCNC: 14 MMOL/L — SIGNIFICANT CHANGE UP (ref 5–17)
APPEARANCE UR: CLEAR — SIGNIFICANT CHANGE UP
APPEARANCE UR: CLEAR — SIGNIFICANT CHANGE UP
APTT BLD: 23.1 SEC — LOW (ref 24.5–35.6)
APTT BLD: 23.1 SEC — LOW (ref 24.5–35.6)
BILIRUB UR-MCNC: NEGATIVE — SIGNIFICANT CHANGE UP
BILIRUB UR-MCNC: NEGATIVE — SIGNIFICANT CHANGE UP
BLD GP AB SCN SERPL QL: NEGATIVE — SIGNIFICANT CHANGE UP
BLD GP AB SCN SERPL QL: NEGATIVE — SIGNIFICANT CHANGE UP
BUN SERPL-MCNC: 16 MG/DL — SIGNIFICANT CHANGE UP (ref 7–23)
BUN SERPL-MCNC: 16 MG/DL — SIGNIFICANT CHANGE UP (ref 7–23)
BUN SERPL-MCNC: 22 MG/DL — SIGNIFICANT CHANGE UP (ref 7–23)
BUN SERPL-MCNC: 22 MG/DL — SIGNIFICANT CHANGE UP (ref 7–23)
CALCIUM SERPL-MCNC: 8.4 MG/DL — SIGNIFICANT CHANGE UP (ref 8.4–10.5)
CALCIUM SERPL-MCNC: 8.4 MG/DL — SIGNIFICANT CHANGE UP (ref 8.4–10.5)
CALCIUM SERPL-MCNC: 9.2 MG/DL — SIGNIFICANT CHANGE UP (ref 8.4–10.5)
CALCIUM SERPL-MCNC: 9.2 MG/DL — SIGNIFICANT CHANGE UP (ref 8.4–10.5)
CHLORIDE SERPL-SCNC: 105 MMOL/L — SIGNIFICANT CHANGE UP (ref 96–108)
CHLORIDE SERPL-SCNC: 105 MMOL/L — SIGNIFICANT CHANGE UP (ref 96–108)
CHLORIDE SERPL-SCNC: 106 MMOL/L — SIGNIFICANT CHANGE UP (ref 96–108)
CHLORIDE SERPL-SCNC: 106 MMOL/L — SIGNIFICANT CHANGE UP (ref 96–108)
CO2 SERPL-SCNC: 21 MMOL/L — LOW (ref 22–31)
CO2 SERPL-SCNC: 21 MMOL/L — LOW (ref 22–31)
CO2 SERPL-SCNC: 23 MMOL/L — SIGNIFICANT CHANGE UP (ref 22–31)
CO2 SERPL-SCNC: 23 MMOL/L — SIGNIFICANT CHANGE UP (ref 22–31)
COLOR SPEC: YELLOW — SIGNIFICANT CHANGE UP
COLOR SPEC: YELLOW — SIGNIFICANT CHANGE UP
CREAT SERPL-MCNC: 1.12 MG/DL — SIGNIFICANT CHANGE UP (ref 0.5–1.3)
CREAT SERPL-MCNC: 1.12 MG/DL — SIGNIFICANT CHANGE UP (ref 0.5–1.3)
CREAT SERPL-MCNC: 1.13 MG/DL — SIGNIFICANT CHANGE UP (ref 0.5–1.3)
CREAT SERPL-MCNC: 1.13 MG/DL — SIGNIFICANT CHANGE UP (ref 0.5–1.3)
DIFF PNL FLD: NEGATIVE — SIGNIFICANT CHANGE UP
DIFF PNL FLD: NEGATIVE — SIGNIFICANT CHANGE UP
EGFR: 49 ML/MIN/1.73M2 — LOW
GLUCOSE SERPL-MCNC: 139 MG/DL — HIGH (ref 70–99)
GLUCOSE SERPL-MCNC: 139 MG/DL — HIGH (ref 70–99)
GLUCOSE SERPL-MCNC: 147 MG/DL — HIGH (ref 70–99)
GLUCOSE SERPL-MCNC: 147 MG/DL — HIGH (ref 70–99)
GLUCOSE UR QL: NEGATIVE MG/DL — SIGNIFICANT CHANGE UP
GLUCOSE UR QL: NEGATIVE MG/DL — SIGNIFICANT CHANGE UP
HCT VFR BLD CALC: 29 % — LOW (ref 34.5–45)
HCT VFR BLD CALC: 29 % — LOW (ref 34.5–45)
HCT VFR BLD CALC: 29.9 % — LOW (ref 34.5–45)
HCT VFR BLD CALC: 29.9 % — LOW (ref 34.5–45)
HCT VFR BLD CALC: 30.1 % — LOW (ref 34.5–45)
HCT VFR BLD CALC: 30.1 % — LOW (ref 34.5–45)
HGB BLD-MCNC: 10.1 G/DL — LOW (ref 11.5–15.5)
HGB BLD-MCNC: 9.5 G/DL — LOW (ref 11.5–15.5)
HGB BLD-MCNC: 9.5 G/DL — LOW (ref 11.5–15.5)
INR BLD: 1.04 RATIO — SIGNIFICANT CHANGE UP (ref 0.85–1.18)
INR BLD: 1.04 RATIO — SIGNIFICANT CHANGE UP (ref 0.85–1.18)
KETONES UR-MCNC: NEGATIVE MG/DL — SIGNIFICANT CHANGE UP
KETONES UR-MCNC: NEGATIVE MG/DL — SIGNIFICANT CHANGE UP
LACTATE SERPL-SCNC: 1.3 MMOL/L — SIGNIFICANT CHANGE UP (ref 0.5–2)
LACTATE SERPL-SCNC: 1.3 MMOL/L — SIGNIFICANT CHANGE UP (ref 0.5–2)
LACTATE SERPL-SCNC: 2.3 MMOL/L — HIGH (ref 0.5–2)
LACTATE SERPL-SCNC: 2.3 MMOL/L — HIGH (ref 0.5–2)
LACTATE SERPL-SCNC: 7.2 MMOL/L — CRITICAL HIGH (ref 0.5–2)
LACTATE SERPL-SCNC: 7.2 MMOL/L — CRITICAL HIGH (ref 0.5–2)
LEUKOCYTE ESTERASE UR-ACNC: NEGATIVE — SIGNIFICANT CHANGE UP
LEUKOCYTE ESTERASE UR-ACNC: NEGATIVE — SIGNIFICANT CHANGE UP
MCHC RBC-ENTMCNC: 30.5 PG — SIGNIFICANT CHANGE UP (ref 27–34)
MCHC RBC-ENTMCNC: 30.5 PG — SIGNIFICANT CHANGE UP (ref 27–34)
MCHC RBC-ENTMCNC: 30.8 PG — SIGNIFICANT CHANGE UP (ref 27–34)
MCHC RBC-ENTMCNC: 30.8 PG — SIGNIFICANT CHANGE UP (ref 27–34)
MCHC RBC-ENTMCNC: 31.2 PG — SIGNIFICANT CHANGE UP (ref 27–34)
MCHC RBC-ENTMCNC: 31.2 PG — SIGNIFICANT CHANGE UP (ref 27–34)
MCHC RBC-ENTMCNC: 32.8 GM/DL — SIGNIFICANT CHANGE UP (ref 32–36)
MCHC RBC-ENTMCNC: 32.8 GM/DL — SIGNIFICANT CHANGE UP (ref 32–36)
MCHC RBC-ENTMCNC: 33.6 GM/DL — SIGNIFICANT CHANGE UP (ref 32–36)
MCHC RBC-ENTMCNC: 33.6 GM/DL — SIGNIFICANT CHANGE UP (ref 32–36)
MCHC RBC-ENTMCNC: 33.8 GM/DL — SIGNIFICANT CHANGE UP (ref 32–36)
MCHC RBC-ENTMCNC: 33.8 GM/DL — SIGNIFICANT CHANGE UP (ref 32–36)
MCV RBC AUTO: 91.2 FL — SIGNIFICANT CHANGE UP (ref 80–100)
MCV RBC AUTO: 91.2 FL — SIGNIFICANT CHANGE UP (ref 80–100)
MCV RBC AUTO: 92.9 FL — SIGNIFICANT CHANGE UP (ref 80–100)
MCV RBC AUTO: 92.9 FL — SIGNIFICANT CHANGE UP (ref 80–100)
MCV RBC AUTO: 93.2 FL — SIGNIFICANT CHANGE UP (ref 80–100)
MCV RBC AUTO: 93.2 FL — SIGNIFICANT CHANGE UP (ref 80–100)
NITRITE UR-MCNC: NEGATIVE — SIGNIFICANT CHANGE UP
NITRITE UR-MCNC: NEGATIVE — SIGNIFICANT CHANGE UP
NRBC # BLD: 0 /100 WBCS — SIGNIFICANT CHANGE UP (ref 0–0)
PH UR: 5 — SIGNIFICANT CHANGE UP (ref 5–8)
PH UR: 5 — SIGNIFICANT CHANGE UP (ref 5–8)
PLATELET # BLD AUTO: 217 K/UL — SIGNIFICANT CHANGE UP (ref 150–400)
PLATELET # BLD AUTO: 217 K/UL — SIGNIFICANT CHANGE UP (ref 150–400)
PLATELET # BLD AUTO: 244 K/UL — SIGNIFICANT CHANGE UP (ref 150–400)
PLATELET # BLD AUTO: 244 K/UL — SIGNIFICANT CHANGE UP (ref 150–400)
PLATELET # BLD AUTO: 248 K/UL — SIGNIFICANT CHANGE UP (ref 150–400)
PLATELET # BLD AUTO: 248 K/UL — SIGNIFICANT CHANGE UP (ref 150–400)
POTASSIUM SERPL-MCNC: 4 MMOL/L — SIGNIFICANT CHANGE UP (ref 3.5–5.3)
POTASSIUM SERPL-MCNC: 4 MMOL/L — SIGNIFICANT CHANGE UP (ref 3.5–5.3)
POTASSIUM SERPL-MCNC: 4.3 MMOL/L — SIGNIFICANT CHANGE UP (ref 3.5–5.3)
POTASSIUM SERPL-MCNC: 4.3 MMOL/L — SIGNIFICANT CHANGE UP (ref 3.5–5.3)
POTASSIUM SERPL-SCNC: 4 MMOL/L — SIGNIFICANT CHANGE UP (ref 3.5–5.3)
POTASSIUM SERPL-SCNC: 4 MMOL/L — SIGNIFICANT CHANGE UP (ref 3.5–5.3)
POTASSIUM SERPL-SCNC: 4.3 MMOL/L — SIGNIFICANT CHANGE UP (ref 3.5–5.3)
POTASSIUM SERPL-SCNC: 4.3 MMOL/L — SIGNIFICANT CHANGE UP (ref 3.5–5.3)
PROT UR-MCNC: SIGNIFICANT CHANGE UP MG/DL
PROT UR-MCNC: SIGNIFICANT CHANGE UP MG/DL
PROTHROM AB SERPL-ACNC: 11.4 SEC — SIGNIFICANT CHANGE UP (ref 9.5–13)
PROTHROM AB SERPL-ACNC: 11.4 SEC — SIGNIFICANT CHANGE UP (ref 9.5–13)
RBC # BLD: 3.11 M/UL — LOW (ref 3.8–5.2)
RBC # BLD: 3.11 M/UL — LOW (ref 3.8–5.2)
RBC # BLD: 3.24 M/UL — LOW (ref 3.8–5.2)
RBC # BLD: 3.24 M/UL — LOW (ref 3.8–5.2)
RBC # BLD: 3.28 M/UL — LOW (ref 3.8–5.2)
RBC # BLD: 3.28 M/UL — LOW (ref 3.8–5.2)
RBC # FLD: 13.8 % — SIGNIFICANT CHANGE UP (ref 10.3–14.5)
RBC # FLD: 14.1 % — SIGNIFICANT CHANGE UP (ref 10.3–14.5)
RBC # FLD: 14.1 % — SIGNIFICANT CHANGE UP (ref 10.3–14.5)
RH IG SCN BLD-IMP: POSITIVE — SIGNIFICANT CHANGE UP
RH IG SCN BLD-IMP: POSITIVE — SIGNIFICANT CHANGE UP
SODIUM SERPL-SCNC: 139 MMOL/L — SIGNIFICANT CHANGE UP (ref 135–145)
SODIUM SERPL-SCNC: 139 MMOL/L — SIGNIFICANT CHANGE UP (ref 135–145)
SODIUM SERPL-SCNC: 140 MMOL/L — SIGNIFICANT CHANGE UP (ref 135–145)
SODIUM SERPL-SCNC: 140 MMOL/L — SIGNIFICANT CHANGE UP (ref 135–145)
SP GR SPEC: 1.02 — SIGNIFICANT CHANGE UP (ref 1–1.03)
SP GR SPEC: 1.02 — SIGNIFICANT CHANGE UP (ref 1–1.03)
UROBILINOGEN FLD QL: 0.2 MG/DL — SIGNIFICANT CHANGE UP (ref 0.2–1)
UROBILINOGEN FLD QL: 0.2 MG/DL — SIGNIFICANT CHANGE UP (ref 0.2–1)
WBC # BLD: 10.97 K/UL — HIGH (ref 3.8–10.5)
WBC # BLD: 10.97 K/UL — HIGH (ref 3.8–10.5)
WBC # BLD: 11.45 K/UL — HIGH (ref 3.8–10.5)
WBC # BLD: 11.45 K/UL — HIGH (ref 3.8–10.5)
WBC # BLD: 12.78 K/UL — HIGH (ref 3.8–10.5)
WBC # BLD: 12.78 K/UL — HIGH (ref 3.8–10.5)
WBC # FLD AUTO: 10.97 K/UL — HIGH (ref 3.8–10.5)
WBC # FLD AUTO: 10.97 K/UL — HIGH (ref 3.8–10.5)
WBC # FLD AUTO: 11.45 K/UL — HIGH (ref 3.8–10.5)
WBC # FLD AUTO: 11.45 K/UL — HIGH (ref 3.8–10.5)
WBC # FLD AUTO: 12.78 K/UL — HIGH (ref 3.8–10.5)
WBC # FLD AUTO: 12.78 K/UL — HIGH (ref 3.8–10.5)

## 2023-12-04 PROCEDURE — 72170 X-RAY EXAM OF PELVIS: CPT | Mod: 26,XE

## 2023-12-04 PROCEDURE — 27236 TREAT THIGH FRACTURE: CPT | Mod: LT

## 2023-12-04 PROCEDURE — 99223 1ST HOSP IP/OBS HIGH 75: CPT

## 2023-12-04 PROCEDURE — 73501 X-RAY EXAM HIP UNI 1 VIEW: CPT | Mod: 26,LT

## 2023-12-04 DEVICE — STEM HIP 132 DEG NECK ANGL ACCOLADE II SZ4: Type: IMPLANTABLE DEVICE | Site: LEFT | Status: FUNCTIONAL

## 2023-12-04 DEVICE — HEAD UNIVERSAL UHR: Type: IMPLANTABLE DEVICE | Site: LEFT | Status: FUNCTIONAL

## 2023-12-04 DEVICE — HEAD FEMORAL: Type: IMPLANTABLE DEVICE | Site: LEFT | Status: FUNCTIONAL

## 2023-12-04 RX ORDER — RIVAROXABAN 15 MG-20MG
20 KIT ORAL DAILY
Refills: 0 | Status: DISCONTINUED | OUTPATIENT
Start: 2023-12-04 | End: 2023-12-04

## 2023-12-04 RX ORDER — PANTOPRAZOLE SODIUM 20 MG/1
40 TABLET, DELAYED RELEASE ORAL
Refills: 0 | Status: DISCONTINUED | OUTPATIENT
Start: 2023-12-04 | End: 2023-12-06

## 2023-12-04 RX ORDER — POLYETHYLENE GLYCOL 3350 17 G/17G
17 POWDER, FOR SOLUTION ORAL DAILY
Refills: 0 | Status: DISCONTINUED | OUTPATIENT
Start: 2023-12-04 | End: 2023-12-06

## 2023-12-04 RX ORDER — MULTIVIT-MIN/FERROUS GLUCONATE 9 MG/15 ML
1 LIQUID (ML) ORAL
Refills: 0 | DISCHARGE

## 2023-12-04 RX ORDER — METOPROLOL TARTRATE 50 MG
1 TABLET ORAL
Refills: 0 | DISCHARGE

## 2023-12-04 RX ORDER — ESCITALOPRAM OXALATE 10 MG/1
1 TABLET, FILM COATED ORAL
Refills: 0 | DISCHARGE

## 2023-12-04 RX ORDER — SODIUM CHLORIDE 9 MG/ML
1000 INJECTION INTRAMUSCULAR; INTRAVENOUS; SUBCUTANEOUS
Refills: 0 | Status: DISCONTINUED | OUTPATIENT
Start: 2023-12-04 | End: 2023-12-06

## 2023-12-04 RX ORDER — ATORVASTATIN CALCIUM 80 MG/1
20 TABLET, FILM COATED ORAL AT BEDTIME
Refills: 0 | Status: DISCONTINUED | OUTPATIENT
Start: 2023-12-04 | End: 2023-12-06

## 2023-12-04 RX ORDER — SODIUM CHLORIDE 9 MG/ML
1000 INJECTION INTRAMUSCULAR; INTRAVENOUS; SUBCUTANEOUS ONCE
Refills: 0 | Status: COMPLETED | OUTPATIENT
Start: 2023-12-04 | End: 2023-12-04

## 2023-12-04 RX ORDER — SODIUM CHLORIDE 9 MG/ML
1000 INJECTION INTRAMUSCULAR; INTRAVENOUS; SUBCUTANEOUS
Refills: 0 | Status: DISCONTINUED | OUTPATIENT
Start: 2023-12-04 | End: 2023-12-04

## 2023-12-04 RX ORDER — ONDANSETRON 8 MG/1
4 TABLET, FILM COATED ORAL EVERY 8 HOURS
Refills: 0 | Status: DISCONTINUED | OUTPATIENT
Start: 2023-12-04 | End: 2023-12-04

## 2023-12-04 RX ORDER — MULTIVIT-MIN/FERROUS GLUCONATE 9 MG/15 ML
0 LIQUID (ML) ORAL
Refills: 0 | DISCHARGE

## 2023-12-04 RX ORDER — ESCITALOPRAM OXALATE 10 MG/1
10 TABLET, FILM COATED ORAL DAILY
Refills: 0 | Status: DISCONTINUED | OUTPATIENT
Start: 2023-12-04 | End: 2023-12-06

## 2023-12-04 RX ORDER — SODIUM CHLORIDE 9 MG/ML
1000 INJECTION, SOLUTION INTRAVENOUS
Refills: 0 | Status: DISCONTINUED | OUTPATIENT
Start: 2023-12-04 | End: 2023-12-04

## 2023-12-04 RX ORDER — OXYCODONE HYDROCHLORIDE 5 MG/1
10 TABLET ORAL EVERY 4 HOURS
Refills: 0 | Status: DISCONTINUED | OUTPATIENT
Start: 2023-12-04 | End: 2023-12-06

## 2023-12-04 RX ORDER — LIPASE/PROTEASE/AMYLASE 16-48-48K
1 CAPSULE,DELAYED RELEASE (ENTERIC COATED) ORAL
Refills: 0 | Status: DISCONTINUED | OUTPATIENT
Start: 2023-12-04 | End: 2023-12-06

## 2023-12-04 RX ORDER — ACETAMINOPHEN 500 MG
975 TABLET ORAL EVERY 8 HOURS
Refills: 0 | Status: DISCONTINUED | OUTPATIENT
Start: 2023-12-05 | End: 2023-12-06

## 2023-12-04 RX ORDER — HYDROCHLOROTHIAZIDE 25 MG
1 TABLET ORAL
Refills: 0 | DISCHARGE

## 2023-12-04 RX ORDER — AMIODARONE HYDROCHLORIDE 400 MG/1
200 TABLET ORAL DAILY
Refills: 0 | Status: DISCONTINUED | OUTPATIENT
Start: 2023-12-04 | End: 2023-12-06

## 2023-12-04 RX ORDER — ASPIRIN/CALCIUM CARB/MAGNESIUM 324 MG
1 TABLET ORAL
Refills: 0 | DISCHARGE

## 2023-12-04 RX ORDER — ACETAMINOPHEN 500 MG
1000 TABLET ORAL ONCE
Refills: 0 | Status: DISCONTINUED | OUTPATIENT
Start: 2023-12-04 | End: 2023-12-04

## 2023-12-04 RX ORDER — ACETAMINOPHEN 500 MG
1000 TABLET ORAL ONCE
Refills: 0 | Status: COMPLETED | OUTPATIENT
Start: 2023-12-04 | End: 2023-12-04

## 2023-12-04 RX ORDER — SODIUM CHLORIDE 9 MG/ML
500 INJECTION, SOLUTION INTRAVENOUS ONCE
Refills: 0 | Status: COMPLETED | OUTPATIENT
Start: 2023-12-04 | End: 2023-12-04

## 2023-12-04 RX ORDER — RIVAROXABAN 15 MG-20MG
20 KIT ORAL DAILY
Refills: 0 | Status: DISCONTINUED | OUTPATIENT
Start: 2023-12-05 | End: 2023-12-06

## 2023-12-04 RX ORDER — ONDANSETRON 8 MG/1
4 TABLET, FILM COATED ORAL EVERY 6 HOURS
Refills: 0 | Status: DISCONTINUED | OUTPATIENT
Start: 2023-12-04 | End: 2023-12-06

## 2023-12-04 RX ORDER — METOPROLOL TARTRATE 50 MG
50 TABLET ORAL DAILY
Refills: 0 | Status: DISCONTINUED | OUTPATIENT
Start: 2023-12-04 | End: 2023-12-06

## 2023-12-04 RX ORDER — LANOLIN ALCOHOL/MO/W.PET/CERES
3 CREAM (GRAM) TOPICAL AT BEDTIME
Refills: 0 | Status: DISCONTINUED | OUTPATIENT
Start: 2023-12-04 | End: 2023-12-06

## 2023-12-04 RX ORDER — LIPASE/PROTEASE/AMYLASE 16-48-48K
1 CAPSULE,DELAYED RELEASE (ENTERIC COATED) ORAL
Refills: 0 | DISCHARGE

## 2023-12-04 RX ORDER — OXYCODONE HYDROCHLORIDE 5 MG/1
5 TABLET ORAL EVERY 4 HOURS
Refills: 0 | Status: DISCONTINUED | OUTPATIENT
Start: 2023-12-04 | End: 2023-12-06

## 2023-12-04 RX ORDER — LIPASE/PROTEASE/AMYLASE 16-48-48K
2 CAPSULE,DELAYED RELEASE (ENTERIC COATED) ORAL
Refills: 0 | DISCHARGE

## 2023-12-04 RX ORDER — LISINOPRIL 2.5 MG/1
1 TABLET ORAL
Refills: 0 | DISCHARGE

## 2023-12-04 RX ORDER — SENNA PLUS 8.6 MG/1
2 TABLET ORAL AT BEDTIME
Refills: 0 | Status: DISCONTINUED | OUTPATIENT
Start: 2023-12-04 | End: 2023-12-06

## 2023-12-04 RX ORDER — ACETAMINOPHEN 500 MG
1000 TABLET ORAL ONCE
Refills: 0 | Status: COMPLETED | OUTPATIENT
Start: 2023-12-05 | End: 2023-12-05

## 2023-12-04 RX ORDER — OMEPRAZOLE 10 MG/1
1 CAPSULE, DELAYED RELEASE ORAL
Refills: 0 | DISCHARGE

## 2023-12-04 RX ADMIN — OXYCODONE HYDROCHLORIDE 5 MILLIGRAM(S): 5 TABLET ORAL at 10:44

## 2023-12-04 RX ADMIN — SODIUM CHLORIDE 100 MILLILITER(S): 9 INJECTION, SOLUTION INTRAVENOUS at 02:58

## 2023-12-04 RX ADMIN — Medication 100 MILLIGRAM(S): at 23:04

## 2023-12-04 RX ADMIN — OXYCODONE HYDROCHLORIDE 5 MILLIGRAM(S): 5 TABLET ORAL at 05:35

## 2023-12-04 RX ADMIN — Medication 975 MILLIGRAM(S): at 05:29

## 2023-12-04 RX ADMIN — ONDANSETRON 4 MILLIGRAM(S): 8 TABLET, FILM COATED ORAL at 13:35

## 2023-12-04 RX ADMIN — Medication 400 MILLIGRAM(S): at 20:09

## 2023-12-04 RX ADMIN — SENNA PLUS 2 TABLET(S): 8.6 TABLET ORAL at 21:06

## 2023-12-04 RX ADMIN — ESCITALOPRAM OXALATE 10 MILLIGRAM(S): 10 TABLET, FILM COATED ORAL at 11:02

## 2023-12-04 RX ADMIN — Medication 975 MILLIGRAM(S): at 13:07

## 2023-12-04 RX ADMIN — ATORVASTATIN CALCIUM 20 MILLIGRAM(S): 80 TABLET, FILM COATED ORAL at 21:06

## 2023-12-04 RX ADMIN — AMIODARONE HYDROCHLORIDE 200 MILLIGRAM(S): 400 TABLET ORAL at 05:29

## 2023-12-04 RX ADMIN — OXYCODONE HYDROCHLORIDE 5 MILLIGRAM(S): 5 TABLET ORAL at 13:42

## 2023-12-04 RX ADMIN — SODIUM CHLORIDE 1000 MILLILITER(S): 9 INJECTION, SOLUTION INTRAVENOUS at 02:21

## 2023-12-04 RX ADMIN — OXYCODONE HYDROCHLORIDE 5 MILLIGRAM(S): 5 TABLET ORAL at 09:37

## 2023-12-04 RX ADMIN — SODIUM CHLORIDE 100 MILLILITER(S): 9 INJECTION INTRAMUSCULAR; INTRAVENOUS; SUBCUTANEOUS at 10:37

## 2023-12-04 RX ADMIN — Medication 1000 MILLIGRAM(S): at 23:57

## 2023-12-04 RX ADMIN — SODIUM CHLORIDE 1000 MILLILITER(S): 9 INJECTION INTRAMUSCULAR; INTRAVENOUS; SUBCUTANEOUS at 09:10

## 2023-12-04 RX ADMIN — LISINOPRIL 10 MILLIGRAM(S): 2.5 TABLET ORAL at 05:29

## 2023-12-04 NOTE — PRE-ANESTHESIA EVALUATION ADULT - NSANTHPMHFT_GEN_ALL_CORE
83 year old Female with hx of afib on xarelto s/p ablation, macular degeneration, pancreatic enzyme deficiency, HTN,. HLD, GERD, pre-DM, c/o L hip pain s/p mechanical fall. ET>4 METS

## 2023-12-04 NOTE — CHART NOTE - NSCHARTNOTEFT_GEN_A_CORE
Discussed case with hospitalist Dr. Marx regarding elevated lactate from 2.3>7.2  Dr. Marx believes elevation is 2/2 LR bolus and LR standing fluids     Dr. Marx recommended 1L bolus NS and ordering a repeat lactate after 1L NS and repeat CBC. Labs ordered STAT   Dr. Marx note to follow

## 2023-12-04 NOTE — PATIENT PROFILE ADULT - HAVE YOU RECENTLY LOST WEIGHT WITHOUT TRYING?
[facility]        Subjective    Patient seen and examined, appears to be doing well this morning, decreasing oxygen requirements, he is on O2 nasal cannula at 5 L/min, afebrile.    Review of Systems   Constitutional: Positive for activity change, chills, fatigue and fever.   HENT: Positive for congestion. Negative for ear discharge and ear pain.    Eyes: Negative for discharge and itching.   Respiratory: Positive for cough and shortness of breath. Negative for chest tightness.    Cardiovascular: Negative for chest pain.   Gastrointestinal: Negative for abdominal distention and abdominal pain.   Endocrine: Negative.    Genitourinary: Negative for difficulty urinating and dysuria.   Musculoskeletal: Negative for arthralgias and joint swelling.   Skin: Negative for color change and wound.   Allergic/Immunologic: Negative.    Neurological: Negative for dizziness and light-headedness.   Hematological: Negative.    Psychiatric/Behavioral: Negative for agitation and confusion.        Objective       Last Recorded Vitals  Blood pressure 114/75, pulse 81, temperature 99.2 °F (37.3 °C), temperature source Oral, resp. rate (!) 21, height 5' 8\" (1.727 m), weight 47.5 kg (104 lb 11.5 oz), SpO2 100 %.  Body mass index is 15.92 kg/m².    Physical Exam  Vitals and nursing note reviewed.   Constitutional:       Appearance: He is ill-appearing.   HENT:      Head: Normocephalic.      Nose: Congestion present.      Mouth/Throat:      Mouth: Mucous membranes are dry.   Eyes:      Pupils: Pupils are equal, round, and reactive to light.   Cardiovascular:      Rate and Rhythm: Tachycardia present.      Pulses: Normal pulses.   Pulmonary:      Effort: Pulmonary effort is normal. No respiratory distress.      Breath sounds: Decreased breath sounds present.      Comments: Right sided decreased breath  sounds  Abdominal:      General: Abdomen is flat. There is no distension.      Tenderness: There is no abdominal tenderness.   Musculoskeletal:         General: No swelling. Normal range of motion.      Cervical back: Normal range of motion.   Skin:     General: Skin is warm and dry.      Capillary Refill: Capillary refill takes less than 2 seconds.   Neurological:      General: No focal deficit present.      Mental Status: He is alert.   Psychiatric:         Mood and Affect: Mood normal.         Behavior: Behavior normal.            Current Facility-Administered Medications   Medication   • sodium PHOSPHATE 20 mmol in sodium chloride 0.9 % 250 mL IVPB   • sodium chloride 0.9% infusion   • parenteral nutrition adult custom central   • metoCLOPramide (REGLAN) injection 5 mg   • albuterol (VENTOLIN) nebulizer 2.5 mg   • pantoprazole (PROTONIX INJECT) injection 40 mg   • [Held by provider] enoxaparin (LOVENOX) injection 30 mg   • dextrose 10 % infusion   • PARENTERAL NUTRITION - DIETITIAN/PHARMACIST MANAGED   • Fat Emulsion Plant Based (Soy) 20 % injection 250 mL   • sodium chloride (PF) 0.9 % injection 10 mL   • sodium chloride (PF) 0.9 % injection 10 mL   • sodium chloride (PF) 0.9 % injection 10 mL   • sodium chloride (PF) 0.9 % injection 10 mL   • sodium chloride (PF) 0.9 % injection 20 mL   • fluconazole (DIFLUCAN) IVPB 200 mg   • meropenem (MERREM) 500 mg in sodium chloride 0.9 % 100 mL IVPB   • guaiFENesin-DM (MUCINEX DM) 600-30 mg ER tablet 2 tablet   • sodium chloride 0.9 % flush bag 25 mL   • sodium chloride (PF) 0.9 % injection 2 mL   • sodium chloride (NORMAL SALINE) 0.9 % bolus 500 mL        Labs     No results displayed because visit has over 200 results.          Culture: Reviewed    Imaging  [facility] LOWER EXTREMITY VENOUS DUPLEX BILATERAL    Result Date: 6/7/2023  BILATERAL LOWER EXTREMITY VENOUS ULTRASOUND CLINICAL INDICATION: Elevated d-dimer.  Shortness of breath.  Bilateral lower extremity swelling.  TECHNIQUE: Bilateral lower extremity venous ultrasound with gray scale and color doppler imaging. FINDINGS:  Bilateral common femoral, femoral, and popliteal veins demonstrate normal compressibility, color Doppler flow signal, phasic variation, and positive augmentation without thrombus. Flow is demonstrated at the origin of the greater saphenous and deep femoral veins. Flow is demonstrated in visualized portions of the calf veins. Remote possibility of an isolated small calf vein thrombus is not entirely excluded.     No evidence of bilateral lower extremity DVT. Electronically Signed by: GENNARO MAN M.D. Signed on: 6/7/2023 1:56 PM Workstation ID: [id number]    XR CHEST AP OR PA    Result Date: 6/7/2023  EXAM: XR CHEST AP OR PA CLINICAL INDICATION: Shortness of breath. COMPARISON: 06/06/2023 FINDINGS: Frontal view of the chest was obtained.  Enteric tube with tip in the stomach. The cardiomediastinal silhouette is stable in appearance.  Redemonstration of near complete opacification of the right lung.  There appears to be increasing density of the consolidation in the right mid lung with areas of cystic change which may be related to necrosis or pulmonary abscesses.  Clinical correlation is advised.  Increasing retrocardiac and left lower lobe hazy opacity suggesting pleural effusion with adjacent airspace disease.     1.  Increasing density in the right mid lung with areas of central lucency suggesting worsening consolidation with possible areas of necrosis or abscess.  Consider necrotizing pneumonia. 2.  Increasing left lower lobe opacity suggesting small pleural effusion with adjacent airspace disease. Electronically Signed by: GENNARO MAN M.D. Signed on: 6/7/2023 1:24 PM Workstation ID: [id number]         Assessment & Plan   1. Necrotizing pneumonia, likely secondary to recurrent aspiration, possible malignancy, work up in progress, treating  2. Esophageal dilation concern for achalasia, s/p EGD 6/5  3.  Significant leukocytosis, improving  4. Acute hypoxia secondary to above       Plan  --Continue Meropenem and Fluconazole  --Monitor LFTs with Fluconazole, stable.  --Sputum culture with yeast  --MRSA nasal screen negative  --Significant leukocytosis, improving, follow cbc  -- Blood cultures final with no growth.  --CTA reviewed with distended esophagus suggesting achalasia with concern for necrotizing pneumonia  --Pulmonary following  --Legionella and strep pneumoniae negative  --Influenza/Covid/RSV negative   --S/p EGD 6/5, tissue from abdomen in process, will follow. GI following  --PICC in place.   --We will continue to monitor closely.        Meropenem 6/7  Fluconazole 6/7    Zosyn 6/3-6/7                No (0)

## 2023-12-04 NOTE — CHART NOTE - NSCHARTNOTEFT_GEN_A_CORE
Medicine called for re-evaluation after lactate returned 7.2.  Evaluated patient on 7 tower. She endorses some left leg pain from the fracture and muscle cramping but otherwise feels well. Has not urinated since 1am but feels like she will have to go soon, and has been NPO in preparation for surgery. No dysuria when she urinated prior, no suprapubic tenderness, abd pain, N/V, fever, chills, SOB, URI symptoms. LLE appears perfused and not mottled, sensation intact. BP stable and patient is afebrile.   It appears that on initial presentation lactate was 2.1, then 2.3 on AM labs. After 500cc of LR bolus, value returned 7.2 on recheck. I suspect increase was in response to the LR bolus given no clinical signs/symptoms of infection, sepsis or ischemia.   Leukocytosis also increased this AM, but given no new symptoms suspect reactive from fracture.    Recommend holding further LR, and recheck of lactate and cbc after 1L NS bolus complete.   If lactate not increasing further no contraindication for the OR today.     d/w ortho resident, DAYTON Blakely and RN

## 2023-12-04 NOTE — PRE PROCEDURE NOTE - PRE PROCEDURE EVALUATION
Dx: L FN fx     Surgeons:  Dr. Pacheco    Med Cleared: Yes    H&P done    Labs- complete     Type and Screen: Complete    EKG: NSR    CXR: no Pacemaker      83F yo w afib on xarelto. To OR tomorrow  for L hip ras w/ Dr. Pacheco.    - Pain Control  - NWB LLE  - DVT PPx: hold all chemical PPx at midnight  - NPO except for meds  - IVF while NPO  - Medical Clearance   - Plan for OR 12/4/23

## 2023-12-04 NOTE — PATIENT PROFILE ADULT - FALL HARM RISK - RISK INTERVENTIONS
Assistance OOB with selected safe patient handling equipment/Assistance with ambulation/Communicate Fall Risk and Risk Factors to all staff, patient, and family/Discuss with provider need for PT consult/Monitor gait and stability/Reinforce activity limits and safety measures with patient and family/Visual Cue: Yellow wristband/Bed in lowest position, wheels locked, appropriate side rails in place/Call bell, personal items and telephone in reach/Instruct patient to call for assistance before getting out of bed or chair/Non-slip footwear when patient is out of bed/[place] to call system/Physically safe environment - no spills, clutter or unnecessary equipment/Purposeful Proactive Rounding/Room/bathroom lighting operational, light cord in reach Assistance OOB with selected safe patient handling equipment/Assistance with ambulation/Communicate Fall Risk and Risk Factors to all staff, patient, and family/Discuss with provider need for PT consult/Monitor gait and stability/Reinforce activity limits and safety measures with patient and family/Visual Cue: Yellow wristband/Bed in lowest position, wheels locked, appropriate side rails in place/Call bell, personal items and telephone in reach/Instruct patient to call for assistance before getting out of bed or chair/Non-slip footwear when patient is out of bed/[place] to call system/Physically safe environment - no spills, clutter or unnecessary equipment/Purposeful Proactive Rounding/Room/bathroom lighting operational, light cord in reach

## 2023-12-04 NOTE — CONSULT NOTE ADULT - PROBLEM SELECTOR RECOMMENDATION 3
BP elevated, possibly due to pain in L hip.   - Can hold lisinopril day of surgery, resume after surgery  - Monitor BP

## 2023-12-04 NOTE — CHART NOTE - NSCHARTNOTEFT_GEN_A_CORE
Patient lactate uptrending from 2.3>7.2 overnight.   1L NS bolus ordered   Repeat lactate for 11am ordered     Called hospitalist in charge to discuss care and recommendations. Hospitalist reporting patient is not yet assigned a hospitalist for day shift and one will be assigned shortly and patient will be examined.    Lab findings discussed with Dr. Pacheco

## 2023-12-04 NOTE — PROGRESS NOTE ADULT - SUBJECTIVE AND OBJECTIVE BOX
Patient seen and examined at bedside this morning, reports isolated left hip pain but overall pain controlled. denies any other issues at this time including CP/SOB    Vital Signs Last 24 Hrs  T(C): 37 (04 Dec 2023 04:21), Max: 37 (04 Dec 2023 04:21)  T(F): 98.6 (04 Dec 2023 04:21), Max: 98.6 (04 Dec 2023 04:21)  HR: 67 (04 Dec 2023 04:21) (60 - 76)  BP: 150/69 (04 Dec 2023 04:21) (150/69 - 195/81)  BP(mean): --  RR: 18 (04 Dec 2023 04:21) (18 - 19)  SpO2: 95% (04 Dec 2023 04:21) (88% - 98%)    Parameters below as of 04 Dec 2023 04:21  Patient On (Oxygen Delivery Method): room air    LABS:                        10.1   12.78 )-----------( 248      ( 04 Dec 2023 01:03 )             29.9     04 Dec 2023 01:03    140    |  105    |  22     ----------------------------<  147    4.0     |  21     |  1.12     Ca    9.2        04 Dec 2023 01:03    TPro  7.3    /  Alb  4.1    /  TBili  0.3    /  DBili  x      /  AST  31     /  ALT  23     /  AlkPhos  66     03 Dec 2023 21:04    PT/INR - ( 04 Dec 2023 01:03 )   PT: 11.4 sec;   INR: 1.04 ratio         PTT - ( 04 Dec 2023 01:03 )  PTT:23.1 sec    PE  Gen: Laying in bed, alert and oriented, NAD  Resp: Unlabored breathing  LLE: Skin intact, no ecchymosis,   SILT DP/SP/ Robel/Saph/Post Tib  +EHL/FHL/TA/Gastroc,   Knee/ankle painless ROM,   hip ROM limited 2/2 pain,   DP+,   soft compartments, no calf ttp,         83yFemale with L Femoral Neck Fracture.     Plan for OR 12/today 4, L hip hemiarthroplasty    - Elevated Lactate 2.3 @0100. s/p 500cc bolus, Continue fluids and repeat lactate at 0800  - Pain control  - IS  - Continue home medications  - SCDs  - Hold home xarelto - last dose 12/2 pm  - NPO, IVF  - CBC/BMP/Coags/UA/T+S x2  - EKG/CXR  - Appreciate Medical clearance prior to planned procedure  - Plan for OR 12/4

## 2023-12-04 NOTE — PRE-ANESTHESIA EVALUATION ADULT - NSANTHOSAYNRD_GEN_A_CORE
No. CR screening performed.  STOP BANG Legend: 0-2 = LOW Risk; 3-4 = INTERMEDIATE Risk; 5-8 = HIGH Risk

## 2023-12-04 NOTE — PRE PROCEDURE NOTE - ATTENDING COMMENTS
Associated images, notes, and labs were reviewed. The patient was seen and examined. Risks and benefits of operative versus nonoperative management were discussed with the patient. The patient showed a good understanding of the injury and the treatment options. They would like to move forward with operative management of the left hip fracture.

## 2023-12-04 NOTE — CONSULT NOTE ADULT - PROBLEM SELECTOR RECOMMENDATION 9
Cardiac history significant for Afib for which patient has received an ablation in Aug 2023 at [facility]. Pt with episode of Afib with RVR in early Aug 2023 possibly incited by COVID/illness around that time. Since ablation, patient has been asymptomatic and has seemed to remain in sinus rhythm. Patient has not had any recent cardiac symptoms.   - EKG personally reviewed showing NSR with HR 65. qtc 480  - TTE from 8/1/23 with normal LVSF EF 66%  - METs >4 with good functional status.  - RCRI score 0 with class I cardiac risk. This risk not currently modifiable.  - Labs reviewed: no significant electrolyte abnormalities.  - Given the nature of the fracture, benefit of surgery outweighs the current risks  - Patient can proceed with the planned surgical procedure Cardiac history significant for Afib for which patient has received an ablation in Aug 2023 at [facility]. Pt with episode of Afib with RVR in early Aug 2023 possibly incited by COVID/illness around that time. Since ablation, patient has been asymptomatic and has seemed to remain in sinus rhythm. Patient has not had any recent cardiac symptoms.   - EKG personally reviewed showing NSR with HR 65. qtc 480  - TTE from 8/1/23 with normal LVSF EF 66%  - METs >4 with good functional status.  - RCRI score 0 with class I cardiac risk. This risk not currently modifiable.  - Labs reviewed: no significant electrolyte abnormalities.  - Given the nature of the fracture, benefit of surgery outweighs the current risks  - Patient can proceed with the planned surgical procedure

## 2023-12-04 NOTE — CONSULT NOTE ADULT - PROBLEM SELECTOR RECOMMENDATION 2
Currently in NSR with rates controlled. [id number] score 4.   - Continue metoprolol succinate perioperatively  - Continue amiodarone perioperatively  - Hold xarelto in anticipation for surgery. Would resume as soon as feasible to prevent thromboembolism given elevated chadsvasc score. Currently in NSR with rates controlled. [id number] score 4.   - Continue metoprolol succinate perioperatively  - Continue amiodarone perioperatively  - Hold xarelto in anticipation for surgery. Would resume as soon as feasible to prevent thromboembolism given elevated chadsvasc score.

## 2023-12-04 NOTE — CHART NOTE - NSCHARTNOTEFT_GEN_A_CORE
POC    Resting without complaints.   Seen in PACU   No Chest Pain, SOB, N/V.    T(C): 36.8 (12-04-23 @ 18:15), Max: 37.2 (12-04-23 @ 08:10)  HR: 61 (12-04-23 @ 18:15) (53 - 76)  BP: 139/65 (12-04-23 @ 18:15) (131/62 - 195/81)  RR: 16 (12-04-23 @ 18:15) (16 - 19)  SpO2: 99% (12-04-23 @ 18:15) (88% - 100%)      Exam:  Alert and [place], No Acute Distress  Pulm: CTAB  Abdomen soft / benign  [place]  [ ]   EXT   LLE        Aquacel dressing C/D [x ]         ABD Pillow in place       Calves soft       (+) DF  PF  EHL/FHL  5/5        No Sensory Deficits noted        2+DP  pulses    Xray:---- prosthesis in good alignment                           9.5<L>  11.45<H> )-----------( 217      ( 04 Dec 2023 17:35 )             29.0<L>     12-04    139  |  106  |  16  ----------------------------<  139<H>  4.3   |  23  |  1.13      A/P: S/p Hemiarthroplasty of left hip    -PT/OT-WBAT- posterior precautions  -Chk AM Labs  -DVT PPx: Xarelto 20mg QD     -- currently dose confirmed w/ patient     -- No longer takes Eliquis or Baby ASA  -Pain Control PO/IV Pain Rx  -Continue Current Tx  -Dispo planning: TBD      ***See Above  Regan BURRIS  Orthopedics  B: [id number] POC    Resting without complaints.   Seen in PACU   No Chest Pain, SOB, N/V.    T(C): 36.8 (12-04-23 @ 18:15), Max: 37.2 (12-04-23 @ 08:10)  HR: 61 (12-04-23 @ 18:15) (53 - 76)  BP: 139/65 (12-04-23 @ 18:15) (131/62 - 195/81)  RR: 16 (12-04-23 @ 18:15) (16 - 19)  SpO2: 99% (12-04-23 @ 18:15) (88% - 100%)      Exam:  Alert and [place], No Acute Distress  Pulm: CTAB  Abdomen soft / benign  [place]  [ ]   EXT   LLE        Aquacel dressing C/D [x ]         ABD Pillow in place       Calves soft       (+) DF  PF  EHL/FHL  5/5        No Sensory Deficits noted        2+DP  pulses    Xray:---- prosthesis in good alignment                           9.5<L>  11.45<H> )-----------( 217      ( 04 Dec 2023 17:35 )             29.0<L>     12-04    139  |  106  |  16  ----------------------------<  139<H>  4.3   |  23  |  1.13      A/P: S/p Hemiarthroplasty of left hip    -PT/OT-WBAT- posterior precautions  -Chk AM Labs  -DVT PPx: Xarelto 20mg QD     -- currently dose confirmed w/ patient     -- No longer takes Eliquis or Baby ASA  -Pain Control PO/IV Pain Rx  -Continue Current Tx  -Dispo planning: TBD      ***See Above  Regan BURRIS  Orthopedics  B: [phone]

## 2023-12-04 NOTE — CONSULT NOTE ADULT - ASSESSMENT
83 F with hx of pAfib on xarelto, s/p ablation, HTN, HLD, GERD, pre-DM, pancreatic enzyme deficiency, presenting after mechanical fall and found to have a L femoral neck fracture. Pt planned for L hip hemiarthroplasty. Medicine consulted for pre-operative evaluation.

## 2023-12-04 NOTE — CONSULT NOTE ADULT - PROBLEM SELECTOR RECOMMENDATION 4
L femoral neck fracture. Planned for L hip hemiarthroplasty with ortho.   - Surgery, management, and pain control as per ortho team.

## 2023-12-04 NOTE — CONSULT NOTE ADULT - SUBJECTIVE AND OBJECTIVE BOX
Librado Roberts MD  Division of Hospital Medicine  Available via MS teams  If no response or off hours, page [phone]    HPI:  83 year old Female with hx of afib on xarelto s/p ablation, macular degeneration, pancreatic enzyme deficiency, HTN,. HLD, GERD, pre-DM, c/o L hip pain s/p mechanical fall. Patient denies head hit or LOC. Patient denies numbness or tingling in the LLLE. Patient denies any other injuries. Pt was carrying boxes down stairs when she missed the last step causing her to fall. Since fall, patient has had exquisite tenderness in L hip. No other recent falls. Denies any chest pain, palpitations, dyspnea on exertion, lightheadedness or dizziness. Pt has had admission last in August where she came in with Afib with RVR. After that admission, patient had an ablation done at [facility] in the same month. Since ablation, patient has remained in sinus rhythm. Pt recently saw her cardiologist on 11/23 and was told that there were no issues. Patient is on xarelto, amiodarone, and metoprolol succinate for her Afib. She has not had any cardiac symptoms. She currently works, as excellent [employer], and is able to walk about 8 city blocks without getting tired. No previous complications with general anesthesia.     SUBJECTIVE: Currently with L hip pain. No other complaints.    PAST MEDICAL & SURGICAL HISTORY:  HTN (hypertension)  HLD (hyperlipidemia)  GERD (gastroesophageal reflux disease)  Breast cancer  Macular degeneration  Exocrine pancreatic insufficiency  H/O left mastectomy  History of hysteroscopy  Status post right knee replacement      Review of Systems:   CONSTITUTIONAL: No fever, weight loss, or fatigue  EYES: No eye pain, visual disturbances, or discharge  ENMT:  No difficulty hearing, tinnitus, vertigo; No sinus or throat pain  NECK: No pain or stiffness  RESPIRATORY: No cough, wheezing, chills or hemoptysis; No shortness of breath  CARDIOVASCULAR: No chest pain, palpitations, dizziness, or leg swelling  GASTROINTESTINAL: No abdominal or epigastric pain. No nausea, vomiting, or hematemesis; No diarrhea or constipation. No melena or hematochezia.  GENITOURINARY: No dysuria, frequency, hematuria, or incontinence  NEUROLOGICAL: No headaches, memory loss, loss of strength, numbness, or tremors  SKIN: No itching, burning, rashes, or lesions   LYMPH NODES: No enlarged glands  ENDOCRINE: No heat or cold intolerance; No hair loss  MUSCULOSKELETAL: No joint pain or swelling; No muscle, back, or extremity pain  PSYCHIATRIC: No depression, anxiety, mood swings, or difficulty sleeping  HEME/LYMPH: No easy bruising, or bleeding gums  ALLERY AND IMMUNOLOGIC: No hives or eczema    Allergies    penicillin (Angioedema)    Intolerances        Social History:     FAMILY HISTORY:   Noncontributory    CAPILLARY BLOOD GLUCOSE        Vital Signs Last 24 Hrs  T(C): 36.9 (04 Dec 2023 01:05), Max: 36.9 (04 Dec 2023 01:05)  T(F): 98.4 (04 Dec 2023 01:05), Max: 98.4 (04 Dec 2023 01:05)  HR: 73 (04 Dec 2023 01:05) (60 - 76)  BP: 155/69 (04 Dec 2023 01:05) (155/69 - 195/81)  BP(mean): --  RR: 18 (04 Dec 2023 01:05) (18 - 19)  SpO2: 98% (04 Dec 2023 01:05) (88% - 98%)    Parameters below as of 04 Dec 2023 01:05  Patient On (Oxygen Delivery Method): room air        PHYSICAL EXAM:  GENERAL:  Well appearing, in NAD  HEAD:  NCAT  EYES: PERRLA, conjunctiva clear  NECK: Supple, No JVD  CHEST/LUNG: CTA B/L. No crackles or wheezing  HEART: Reg rate. Normal S1, S2. No m/r/g.   ABDOMEN: SNTND. Bowel sounds present  EXTREMITIES:  trace LE edema. L hip pain movement of LLE  PSYCH: AAOx3, appropriate affect  NEUROLOGY: grossly non-focal  SKIN: No rashes or lesions    LABS:                        10.1   12.78 )-----------( 248      ( 04 Dec 2023 01:03 )             29.9     12-04    140  |  105  |  22  ----------------------------<  147<H>  4.0   |  21<L>  |  1.12    Ca    9.2      04 Dec 2023 01:03    TPro  7.3  /  Alb  4.1  /  TBili  0.3  /  DBili  x   /  AST  31  /  ALT  23  /  AlkPhos  66  12-03    PT/INR - ( 04 Dec 2023 01:03 )   PT: 11.4 sec;   INR: 1.04 ratio         PTT - ( 04 Dec 2023 01:03 )  PTT:23.1 sec      Urinalysis Basic - ( 04 Dec 2023 01:03 )    Color: x / Appearance: x / SG: x / pH: x  Gluc: 147 mg/dL / Ketone: x  / Bili: x / Urobili: x   Blood: x / Protein: x / Nitrite: x   Leuk Esterase: x / RBC: x / WBC x   Sq Epi: x / Non Sq Epi: x / Bacteria: x          MEDICATIONS  (STANDING):  acetaminophen     Tablet .. 975 milliGRAM(s) Oral every 8 hours  aMIOdarone    Tablet 200 milliGRAM(s) Oral daily  atorvastatin 20 milliGRAM(s) Oral at bedtime  escitalopram 10 milliGRAM(s) Oral daily  lactated ringers 1000 milliLiter(s) (100 mL/Hr) IV Continuous <Continuous>  lisinopril 10 milliGRAM(s) Oral daily  metoprolol succinate ER 50 milliGRAM(s) Oral daily  pancrelipase  (CREON 24,000 Lipase Units) 1 Capsule(s) Oral three times a day with meals  senna 2 Tablet(s) Oral at bedtime    MEDICATIONS  (PRN):  magnesium hydroxide Suspension 30 milliLiter(s) Oral daily PRN Constipation  oxyCODONE    IR 2.5 milliGRAM(s) Oral every 4 hours PRN Moderate Pain (4 - 6)  oxyCODONE    IR 5 milliGRAM(s) Oral every 4 hours PRN Severe Pain (7 - 10)      Home Medications:  amiodarone 200 mg oral tablet: 1 tab(s) orally once a day (03 Dec 2023 23:56)  Aspir 81 oral delayed release tablet: 1 orally once a day (08 Aug 2023 14:17)  calcium (as carbonate)-vitamin D 500 mg-400 intl units (10 mcg) oral tablet: 1 orally (08 Aug 2023 14:17)  Centrum Chewables Adults oral tablet, chewable: 1 chewed once a day (08 Aug 2023 14:17)  hydroCHLOROthiazide 12.5 mg oral capsule: 1 orally once a day (08 Aug 2023 14:17)  Lexapro 10 mg oral tablet: 1 orally once a day (08 Aug 2023 14:17)  Lipitor 20 mg oral tablet: 1 orally (08 Aug 2023 14:17)  lisinopril 10 mg oral tablet: 1 orally once a day (08 Aug 2023 14:17)  metoprolol succinate 50 mg oral tablet, extended release: 1 orally once a day (at bedtime) (08 Aug 2023 14:17)  omeprazole 10 mg oral delayed release capsule: 1 orally once a day (08 Aug 2023 14:17)  pancrelipase 24,000 units-76,000 units-120,000 units oral delayed release capsule: 1 orally 3 times a day with meals (08 Aug 2023 14:17)  PreserVision AREDS Lutein oral capsule: orally once a day (08 Aug 2023 14:17)  Xarelto 20 mg oral tablet: 1 tab(s) orally once a day (03 Dec 2023 23:56)     Librado Roberts MD  Division of Hospital Medicine  Available via MS teams  If no response or off hours, page [phone]    HPI:  83 year old Female with hx of afib on xarelto s/p ablation, macular degeneration, pancreatic enzyme deficiency, HTN,. HLD, GERD, pre-DM, c/o L hip pain s/p mechanical fall. Patient denies head hit or LOC. Patient denies numbness or tingling in the LLLE. Patient denies any other injuries. Pt was carrying boxes down stairs when she missed the last step causing her to fall. Since fall, patient has had exquisite tenderness in L hip. No other recent falls. Denies any chest pain, palpitations, dyspnea on exertion, lightheadedness or dizziness. Pt has had admission last in August where she came in with Afib with RVR. After that admission, patient had an ablation done at [facility] in the same month. Since ablation, patient has remained in sinus rhythm. Pt recently saw her cardiologist on 11/23 and was told that there were no issues. Patient is on xarelto, amiodarone, and metoprolol succinate for her Afib. She has not had any cardiac symptoms. She currently works, as excellent [employer], and is able to walk about 8 city blocks without getting tired. No previous complications with general anesthesia.     SUBJECTIVE: Currently with L hip pain. No other complaints.    PAST MEDICAL & SURGICAL HISTORY:  HTN (hypertension)  HLD (hyperlipidemia)  GERD (gastroesophageal reflux disease)  Breast cancer  Macular degeneration  Exocrine pancreatic insufficiency  H/O left mastectomy  History of hysteroscopy  Status post right knee replacement      Review of Systems:   CONSTITUTIONAL: No fever, weight loss, or fatigue  EYES: No eye pain, visual disturbances, or discharge  ENMT:  No difficulty hearing, tinnitus, vertigo; No sinus or throat pain  NECK: No pain or stiffness  RESPIRATORY: No cough, wheezing, chills or hemoptysis; No shortness of breath  CARDIOVASCULAR: No chest pain, palpitations, dizziness, or leg swelling  GASTROINTESTINAL: No abdominal or epigastric pain. No nausea, vomiting, or hematemesis; No diarrhea or constipation. No melena or hematochezia.  GENITOURINARY: No dysuria, frequency, hematuria, or incontinence  NEUROLOGICAL: No headaches, memory loss, loss of strength, numbness, or tremors  SKIN: No itching, burning, rashes, or lesions   LYMPH NODES: No enlarged glands  ENDOCRINE: No heat or cold intolerance; No hair loss  MUSCULOSKELETAL: No joint pain or swelling; No muscle, back, or extremity pain  PSYCHIATRIC: No depression, anxiety, mood swings, or difficulty sleeping  HEME/LYMPH: No easy bruising, or bleeding gums  ALLERY AND IMMUNOLOGIC: No hives or eczema    Allergies    penicillin (Angioedema)    Intolerances        Social History:     FAMILY HISTORY:   Noncontributory    CAPILLARY BLOOD GLUCOSE        Vital Signs Last 24 Hrs  T(C): 36.9 (04 Dec 2023 01:05), Max: 36.9 (04 Dec 2023 01:05)  T(F): 98.4 (04 Dec 2023 01:05), Max: 98.4 (04 Dec 2023 01:05)  HR: 73 (04 Dec 2023 01:05) (60 - 76)  BP: 155/69 (04 Dec 2023 01:05) (155/69 - 195/81)  BP(mean): --  RR: 18 (04 Dec 2023 01:05) (18 - 19)  SpO2: 98% (04 Dec 2023 01:05) (88% - 98%)    Parameters below as of 04 Dec 2023 01:05  Patient On (Oxygen Delivery Method): room air        PHYSICAL EXAM:  GENERAL:  Well appearing, in NAD  HEAD:  NCAT  EYES: PERRLA, conjunctiva clear  NECK: Supple, No JVD  CHEST/LUNG: CTA B/L. No crackles or wheezing  HEART: Reg rate. Normal S1, S2. No m/r/g.   ABDOMEN: SNTND. Bowel sounds present  EXTREMITIES:  trace LE edema. L hip pain movement of LLE  PSYCH: AAOx3, appropriate affect  NEUROLOGY: grossly non-focal  SKIN: No rashes or lesions    LABS:                        10.1   12.78 )-----------( 248      ( 04 Dec 2023 01:03 )             29.9     12-04    140  |  105  |  22  ----------------------------<  147<H>  4.0   |  21<L>  |  1.12    Ca    9.2      04 Dec 2023 01:03    TPro  7.3  /  Alb  4.1  /  TBili  0.3  /  DBili  x   /  AST  31  /  ALT  23  /  AlkPhos  66  12-03    PT/INR - ( 04 Dec 2023 01:03 )   PT: 11.4 sec;   INR: 1.04 ratio         PTT - ( 04 Dec 2023 01:03 )  PTT:23.1 sec      Urinalysis Basic - ( 04 Dec 2023 01:03 )    Color: x / Appearance: x / SG: x / pH: x  Gluc: 147 mg/dL / Ketone: x  / Bili: x / Urobili: x   Blood: x / Protein: x / Nitrite: x   Leuk Esterase: x / RBC: x / WBC x   Sq Epi: x / Non Sq Epi: x / Bacteria: x          MEDICATIONS  (STANDING):  acetaminophen     Tablet .. 975 milliGRAM(s) Oral every 8 hours  aMIOdarone    Tablet 200 milliGRAM(s) Oral daily  atorvastatin 20 milliGRAM(s) Oral at bedtime  escitalopram 10 milliGRAM(s) Oral daily  lactated ringers 1000 milliLiter(s) (100 mL/Hr) IV Continuous <Continuous>  lisinopril 10 milliGRAM(s) Oral daily  metoprolol succinate ER 50 milliGRAM(s) Oral daily  pancrelipase  (CREON 24,000 Lipase Units) 1 Capsule(s) Oral three times a day with meals  senna 2 Tablet(s) Oral at bedtime    MEDICATIONS  (PRN):  magnesium hydroxide Suspension 30 milliLiter(s) Oral daily PRN Constipation  oxyCODONE    IR 2.5 milliGRAM(s) Oral every 4 hours PRN Moderate Pain (4 - 6)  oxyCODONE    IR 5 milliGRAM(s) Oral every 4 hours PRN Severe Pain (7 - 10)      Home Medications:  amiodarone 200 mg oral tablet: 1 tab(s) orally once a day (03 Dec 2023 23:56)  Aspir 81 oral delayed release tablet: 1 orally once a day (08 Aug 2023 14:17)  calcium (as carbonate)-vitamin D 500 mg-400 intl units (10 mcg) oral tablet: 1 orally (08 Aug 2023 14:17)  Centrum Chewables Adults oral tablet, chewable: 1 chewed once a day (08 Aug 2023 14:17)  hydroCHLOROthiazide 12.5 mg oral capsule: 1 orally once a day (08 Aug 2023 14:17)  Lexapro 10 mg oral tablet: 1 orally once a day (08 Aug 2023 14:17)  Lipitor 20 mg oral tablet: 1 orally (08 Aug 2023 14:17)  lisinopril 10 mg oral tablet: 1 orally once a day (08 Aug 2023 14:17)  metoprolol succinate 50 mg oral tablet, extended release: 1 orally once a day (at bedtime) (08 Aug 2023 14:17)  omeprazole 10 mg oral delayed release capsule: 1 orally once a day (08 Aug 2023 14:17)  pancrelipase 24,000 units-76,000 units-120,000 units oral delayed release capsule: 1 orally 3 times a day with meals (08 Aug 2023 14:17)  PreserVision AREDS Lutein oral capsule: orally once a day (08 Aug 2023 14:17)  Xarelto 20 mg oral tablet: 1 tab(s) orally once a day (03 Dec 2023 23:56)

## 2023-12-05 ENCOUNTER — TRANSCRIPTION ENCOUNTER (OUTPATIENT)
Age: 83
End: 2023-12-05

## 2023-12-05 LAB
ANION GAP SERPL CALC-SCNC: 10 MMOL/L — SIGNIFICANT CHANGE UP (ref 5–17)
ANION GAP SERPL CALC-SCNC: 10 MMOL/L — SIGNIFICANT CHANGE UP (ref 5–17)
BASOPHILS # BLD AUTO: 0.02 K/UL — SIGNIFICANT CHANGE UP (ref 0–0.2)
BASOPHILS # BLD AUTO: 0.02 K/UL — SIGNIFICANT CHANGE UP (ref 0–0.2)
BASOPHILS NFR BLD AUTO: 0.2 % — SIGNIFICANT CHANGE UP (ref 0–2)
BASOPHILS NFR BLD AUTO: 0.2 % — SIGNIFICANT CHANGE UP (ref 0–2)
BUN SERPL-MCNC: 16 MG/DL — SIGNIFICANT CHANGE UP (ref 7–23)
BUN SERPL-MCNC: 16 MG/DL — SIGNIFICANT CHANGE UP (ref 7–23)
CALCIUM SERPL-MCNC: 8.6 MG/DL — SIGNIFICANT CHANGE UP (ref 8.4–10.5)
CALCIUM SERPL-MCNC: 8.6 MG/DL — SIGNIFICANT CHANGE UP (ref 8.4–10.5)
CHLORIDE SERPL-SCNC: 104 MMOL/L — SIGNIFICANT CHANGE UP (ref 96–108)
CHLORIDE SERPL-SCNC: 104 MMOL/L — SIGNIFICANT CHANGE UP (ref 96–108)
CO2 SERPL-SCNC: 24 MMOL/L — SIGNIFICANT CHANGE UP (ref 22–31)
CO2 SERPL-SCNC: 24 MMOL/L — SIGNIFICANT CHANGE UP (ref 22–31)
CREAT SERPL-MCNC: 1.07 MG/DL — SIGNIFICANT CHANGE UP (ref 0.5–1.3)
CREAT SERPL-MCNC: 1.07 MG/DL — SIGNIFICANT CHANGE UP (ref 0.5–1.3)
EGFR: 52 ML/MIN/1.73M2 — LOW
EGFR: 52 ML/MIN/1.73M2 — LOW
EOSINOPHIL # BLD AUTO: 0 K/UL — SIGNIFICANT CHANGE UP (ref 0–0.5)
EOSINOPHIL # BLD AUTO: 0 K/UL — SIGNIFICANT CHANGE UP (ref 0–0.5)
EOSINOPHIL NFR BLD AUTO: 0 % — SIGNIFICANT CHANGE UP (ref 0–6)
EOSINOPHIL NFR BLD AUTO: 0 % — SIGNIFICANT CHANGE UP (ref 0–6)
GLUCOSE SERPL-MCNC: 150 MG/DL — HIGH (ref 70–99)
GLUCOSE SERPL-MCNC: 150 MG/DL — HIGH (ref 70–99)
HCT VFR BLD CALC: 27 % — LOW (ref 34.5–45)
HCT VFR BLD CALC: 27 % — LOW (ref 34.5–45)
HGB BLD-MCNC: 9 G/DL — LOW (ref 11.5–15.5)
HGB BLD-MCNC: 9 G/DL — LOW (ref 11.5–15.5)
IMM GRANULOCYTES NFR BLD AUTO: 0.4 % — SIGNIFICANT CHANGE UP (ref 0–0.9)
IMM GRANULOCYTES NFR BLD AUTO: 0.4 % — SIGNIFICANT CHANGE UP (ref 0–0.9)
LYMPHOCYTES # BLD AUTO: 0.43 K/UL — LOW (ref 1–3.3)
LYMPHOCYTES # BLD AUTO: 0.43 K/UL — LOW (ref 1–3.3)
LYMPHOCYTES # BLD AUTO: 4.6 % — LOW (ref 13–44)
LYMPHOCYTES # BLD AUTO: 4.6 % — LOW (ref 13–44)
MCHC RBC-ENTMCNC: 31 PG — SIGNIFICANT CHANGE UP (ref 27–34)
MCHC RBC-ENTMCNC: 31 PG — SIGNIFICANT CHANGE UP (ref 27–34)
MCHC RBC-ENTMCNC: 33.3 GM/DL — SIGNIFICANT CHANGE UP (ref 32–36)
MCHC RBC-ENTMCNC: 33.3 GM/DL — SIGNIFICANT CHANGE UP (ref 32–36)
MCV RBC AUTO: 93.1 FL — SIGNIFICANT CHANGE UP (ref 80–100)
MCV RBC AUTO: 93.1 FL — SIGNIFICANT CHANGE UP (ref 80–100)
MONOCYTES # BLD AUTO: 0.59 K/UL — SIGNIFICANT CHANGE UP (ref 0–0.9)
MONOCYTES # BLD AUTO: 0.59 K/UL — SIGNIFICANT CHANGE UP (ref 0–0.9)
MONOCYTES NFR BLD AUTO: 6.3 % — SIGNIFICANT CHANGE UP (ref 2–14)
MONOCYTES NFR BLD AUTO: 6.3 % — SIGNIFICANT CHANGE UP (ref 2–14)
NEUTROPHILS # BLD AUTO: 8.26 K/UL — HIGH (ref 1.8–7.4)
NEUTROPHILS # BLD AUTO: 8.26 K/UL — HIGH (ref 1.8–7.4)
NEUTROPHILS NFR BLD AUTO: 88.5 % — HIGH (ref 43–77)
NEUTROPHILS NFR BLD AUTO: 88.5 % — HIGH (ref 43–77)
NRBC # BLD: 0 /100 WBCS — SIGNIFICANT CHANGE UP (ref 0–0)
NRBC # BLD: 0 /100 WBCS — SIGNIFICANT CHANGE UP (ref 0–0)
PLATELET # BLD AUTO: 218 K/UL — SIGNIFICANT CHANGE UP (ref 150–400)
PLATELET # BLD AUTO: 218 K/UL — SIGNIFICANT CHANGE UP (ref 150–400)
POTASSIUM SERPL-MCNC: 4.7 MMOL/L — SIGNIFICANT CHANGE UP (ref 3.5–5.3)
POTASSIUM SERPL-MCNC: 4.7 MMOL/L — SIGNIFICANT CHANGE UP (ref 3.5–5.3)
POTASSIUM SERPL-SCNC: 4.7 MMOL/L — SIGNIFICANT CHANGE UP (ref 3.5–5.3)
POTASSIUM SERPL-SCNC: 4.7 MMOL/L — SIGNIFICANT CHANGE UP (ref 3.5–5.3)
RBC # BLD: 2.9 M/UL — LOW (ref 3.8–5.2)
RBC # BLD: 2.9 M/UL — LOW (ref 3.8–5.2)
RBC # FLD: 14 % — SIGNIFICANT CHANGE UP (ref 10.3–14.5)
RBC # FLD: 14 % — SIGNIFICANT CHANGE UP (ref 10.3–14.5)
SODIUM SERPL-SCNC: 138 MMOL/L — SIGNIFICANT CHANGE UP (ref 135–145)
SODIUM SERPL-SCNC: 138 MMOL/L — SIGNIFICANT CHANGE UP (ref 135–145)
WBC # BLD: 9.34 K/UL — SIGNIFICANT CHANGE UP (ref 3.8–10.5)
WBC # BLD: 9.34 K/UL — SIGNIFICANT CHANGE UP (ref 3.8–10.5)
WBC # FLD AUTO: 9.34 K/UL — SIGNIFICANT CHANGE UP (ref 3.8–10.5)
WBC # FLD AUTO: 9.34 K/UL — SIGNIFICANT CHANGE UP (ref 3.8–10.5)

## 2023-12-05 RX ORDER — LISINOPRIL 2.5 MG/1
10 TABLET ORAL DAILY
Refills: 0 | Status: DISCONTINUED | OUTPATIENT
Start: 2023-12-05 | End: 2023-12-06

## 2023-12-05 RX ADMIN — ESCITALOPRAM OXALATE 10 MILLIGRAM(S): 10 TABLET, FILM COATED ORAL at 11:53

## 2023-12-05 RX ADMIN — Medication 1 TABLET(S): at 17:08

## 2023-12-05 RX ADMIN — Medication 50 MILLIGRAM(S): at 05:19

## 2023-12-05 RX ADMIN — Medication 400 MILLIGRAM(S): at 00:47

## 2023-12-05 RX ADMIN — AMIODARONE HYDROCHLORIDE 200 MILLIGRAM(S): 400 TABLET ORAL at 05:18

## 2023-12-05 RX ADMIN — Medication 100 MILLIGRAM(S): at 07:01

## 2023-12-05 RX ADMIN — PANTOPRAZOLE SODIUM 40 MILLIGRAM(S): 20 TABLET, DELAYED RELEASE ORAL at 05:20

## 2023-12-05 RX ADMIN — Medication 400 MILLIGRAM(S): at 09:46

## 2023-12-05 RX ADMIN — RIVAROXABAN 20 MILLIGRAM(S): KIT at 11:57

## 2023-12-05 RX ADMIN — Medication 1 TABLET(S): at 11:54

## 2023-12-05 RX ADMIN — Medication 1 CAPSULE(S): at 08:37

## 2023-12-05 RX ADMIN — Medication 1000 MILLIGRAM(S): at 02:30

## 2023-12-05 RX ADMIN — OXYCODONE HYDROCHLORIDE 10 MILLIGRAM(S): 5 TABLET ORAL at 17:08

## 2023-12-05 RX ADMIN — OXYCODONE HYDROCHLORIDE 10 MILLIGRAM(S): 5 TABLET ORAL at 17:43

## 2023-12-05 RX ADMIN — Medication 1 TABLET(S): at 05:19

## 2023-12-05 RX ADMIN — LISINOPRIL 10 MILLIGRAM(S): 2.5 TABLET ORAL at 08:36

## 2023-12-05 RX ADMIN — Medication 1 CAPSULE(S): at 17:09

## 2023-12-05 RX ADMIN — ATORVASTATIN CALCIUM 20 MILLIGRAM(S): 80 TABLET, FILM COATED ORAL at 21:08

## 2023-12-05 RX ADMIN — Medication 1 CAPSULE(S): at 11:54

## 2023-12-05 NOTE — DISCHARGE NOTE PROVIDER - NSDCFUADDINST_GEN_ALL_CORE_FT
Keep surgical incision.dressing clean and dry. Maintain weight bearing as tolerated ambulation on left lower extremity in cast boot. have dressing/sutures/staples removed and steri-strips apploed post operative day #14 (12/19/2023)

## 2023-12-05 NOTE — DISCHARGE NOTE PROVIDER - NSDCCPCAREPLAN_GEN_ALL_CORE_FT
PRINCIPAL DISCHARGE DIAGNOSIS  Diagnosis: Femoral neck fracture  Assessment and Plan of Treatment: Left femoral neck fx

## 2023-12-05 NOTE — OCCUPATIONAL THERAPY INITIAL EVALUATION ADULT - BALANCE DISTURBANCE, SYSTEM IMPAIRMENT CONTRIBUTE, REHAB EVAL
SUBJECTIVE:   Trent Kaplan is a 11 year old male who presents to clinic today with mother because of:    Chief Complaint   Patient presents with     Throat Pain        HPI  ENT/Cough Symptoms    Problem started: 2 days ago  Fever: School was 99.7  Runny nose: YES  Congestion: no  Sore Throat: YES  Cough: mild cough  Eye discharge/redness:  no  Ear Pain: no  Wheeze: no   Sick contacts: None;  Strep exposure: School;  Therapies Tried: Ibu    Trent didn't feel well last night and this morning.  He was able to eat breakfast so went to school as normal.  However, he went to the school nurse this morning with complaints of sore throat and had a temp of 99.7.  He also has been more emotional today.  No headache or stomach ache.  No skin rashes.  No rhinorrhea or cough.  No vomiting or diarrhea.     ROS  Constitutional, eye, ENT, skin, respiratory, cardiac, and GI are normal except as otherwise noted.    PROBLEM LIST  Patient Active Problem List    Diagnosis Date Noted     ADHD (attention deficit hyperactivity disorder) 01/13/2017     Priority: Medium     Anxiety 01/13/2017     Priority: Medium     HEMANGIOMA right lateral back-5x4.5cm 2008     Priority: Medium     June 18, 2008: Trent saw derm-no treatment at this time  November 21, 2008: 5x4.5cm, no change in size. No symptoms.  May 11, 2009: no change in size. No symptoms.         MEDICATIONS  Current Outpatient Prescriptions   Medication Sig Dispense Refill     desmopressin (DDAVP) 0.2 MG tablet Take 1-2 tablets by mouth one hour before bed. 60 tablet 0     dexmethylphenidate (FOCALIN XR) 10 MG 24 hr capsule Take 1 capsule (10 mg) by mouth daily 30 capsule 0     [START ON 11/26/2018] dexmethylphenidate (FOCALIN XR) 10 MG 24 hr capsule Take 1 capsule (10 mg) by mouth daily 30 capsule 0     dexmethylphenidate (FOCALIN XR) 10 MG 24 hr capsule Take 1 capsule (10 mg) by mouth daily 30 capsule 0     IBUPROFEN PO Take 200 mg by mouth every 6 hours       MELATONIN PO  Take 5 mg by mouth       Omega-3 Fatty Acids (OMEGA-3 FISH OIL PO) Take 100 mg by mouth       polyethylene glycol (MIRALAX/GLYCOLAX) packet Take 1 packet by mouth daily       VITAMIN D, CHOLECALCIFEROL, PO Take by mouth daily        ALLERGIES  No Known Allergies    Reviewed and updated as needed this visit by clinical staff         Reviewed and updated as needed this visit by Provider       OBJECTIVE:     BP (!) 87/54  Pulse 68  Temp 97.6  F (36.4  C) (Tympanic)  Wt 69 lb 3.2 oz (31.4 kg)  No height on file for this encounter.  28 %ile based on CDC 2-20 Years weight-for-age data using vitals from 11/14/2018.  No height and weight on file for this encounter.  No height on file for this encounter.    GENERAL: Active, alert, in no acute distress.  SKIN: Clear. No significant rash, abnormal pigmentation or lesions  HEAD: Normocephalic.  EYES:  No discharge or erythema. Normal pupils and EOM.  EARS: Normal canals. Tympanic membranes are normal; gray and translucent.  NOSE: Normal without discharge.  MOUTH/THROAT: Clear. No oral lesions. Teeth intact without obvious abnormalities.  NECK: Supple, no masses.  LYMPH NODES: few pea-sized lymph nodes in anterior and posterior cervical chains  LUNGS: Clear. No rales, rhonchi, wheezing or retractions  HEART: Regular rhythm. Normal S1/S2. No murmurs.    DIAGNOSTICS:   Results for orders placed or performed in visit on 11/14/18 (from the past 24 hour(s))   Rapid strep screen   Result Value Ref Range    Specimen Description Throat     Rapid Strep A Screen       NEGATIVE: No Group A streptococcal antigen detected by immunoassay, await culture report.       ASSESSMENT/PLAN:   1. Throat pain  Likely viral illness.  Will follow up on throat culture results and treat as appropriate.  Encouraged continued supportive care and monitoring.  - Rapid strep screen  - Beta strep group A culture    FOLLOW UP: If not improving in 1-2 weeks or if worsening, he should be seen again.    Rebecca  SHAJI Estrada CNP      musculoskeletal

## 2023-12-05 NOTE — PHYSICAL THERAPY INITIAL EVALUATION ADULT - GAIT DEVIATIONS NOTED, PT EVAL
+difficulty bearing weight on LLE/decreased robyn/decreased step length/decreased stride length/decreased weight-shifting ability

## 2023-12-05 NOTE — PHYSICAL THERAPY INITIAL EVALUATION ADULT - ADDITIONAL COMMENTS
Pt lives in an apartment in a house with 4 sisters.  Pt is independent with ADLs and ambulation, does not use any DME.  Pt works as a professor on a campus.  Pt has 1 GALO and 25 stairs to the living space with a chairlift.

## 2023-12-05 NOTE — PHYSICAL THERAPY INITIAL EVALUATION ADULT - PERTINENT HX OF CURRENT PROBLEM, REHAB EVAL
Pt is an 84 year old female with PMH significant for afib on xarelto, macular degeneration, pancreatic enzyme deficiency.  Pt presents with L hip pain s/p mechanical fall. Patient denies head hit or LOC. XR demonstrating with L Femoral Neck Fracture  Pt POD#1 hemiarthroplasty of L hip.  Per orthopedics note, pt with posterior precautions of L hip.  CXR(12/3): Hyperaerated lungs with no focal consolidation. No pneumothorax..  XR Hip(12/3): Displaced transcervical left femoral neck fracture. Degenerative changes of the pelvis and bilateral hips.  L Knee XR(12/3): Limited views of the left knee. No displaced fracture.  Hip XR Intraoperative(12/4): Cementless left hip hemiarthroplasty prosthesis implanted.  Intact and aligned hardware and no periprosthetic fractures. Postoperative soft tissue changes. Correlate with intraoperative findings. Tiny nodular focus calcific tendinosis adjacent to inferolateral left   greater trochanter margin.

## 2023-12-05 NOTE — OCCUPATIONAL THERAPY INITIAL EVALUATION ADULT - LIVES WITH, PROFILE
Pt lives in an apt in a house with 4 sisters +1 GALO +25 steps inside with a chair lift. Pt was independent PTA./friend

## 2023-12-05 NOTE — DISCHARGE NOTE PROVIDER - NSDCMRMEDTOKEN_GEN_ALL_CORE_FT
amiodarone 200 mg oral tablet: 1 tab(s) orally once a day  Aspir 81 oral delayed release tablet: 1 orally once a day  calcium (as carbonate)-vitamin D 500 mg-400 intl units (10 mcg) oral tablet: 1 orally  Centrum Chewables Adults oral tablet, chewable: 1 chewed once a day  Eliquis 5 mg oral tablet: 1 tab(s) orally 2 times a day  hydroCHLOROthiazide 12.5 mg oral capsule: 1 orally once a day  Lexapro 10 mg oral tablet: 1 orally once a day  Lipitor 20 mg oral tablet: 1 orally  lisinopril 10 mg oral tablet: 1 orally once a day  metoprolol succinate 50 mg oral tablet, extended release: 1 orally once a day (at bedtime)  omeprazole 10 mg oral delayed release capsule: 1 orally once a day  pancrelipase 24,000 units-76,000 units-120,000 units oral delayed release capsule: 1 orally 3 times a day with meals  PreserVision AREDS Lutein oral capsule: orally once a day  senna leaf extract oral tablet: 2 tab(s) orally once a day (at bedtime)  Xarelto 20 mg oral tablet: 1 tab(s) orally once a day   acetaminophen 325 mg oral tablet: 3 tab(s) orally every 8 hours as needed for Fever, H/A,, Mild Pain (1 - 3)  amiodarone 200 mg oral tablet: 1 tab(s) orally once a day  Aspir 81 oral delayed release tablet: 1 orally once a day  calcium (as carbonate)-vitamin D 500 mg-400 intl units (10 mcg) oral tablet: 1 tablet orally once a day  Centrum Chewables Adults oral tablet, chewable: 1 chewed once a day  hydroCHLOROthiazide 12.5 mg oral capsule: 1 orally once a day  Lexapro 10 mg oral tablet: 1 orally once a day  Lipitor 20 mg oral tablet: 1 tablet orally once a day (at bedtime)  lisinopril 10 mg oral tablet: 1 orally once a day  metoprolol succinate 50 mg oral tablet, extended release: 1 orally once a day (at bedtime)  omeprazole 10 mg oral delayed release capsule: 1 orally once a day  oxyCODONE 5 mg oral tablet: 1 tab(s) orally every 4 hours as needed for severe pain  pancrelipase 24,000 units-76,000 units-120,000 units oral delayed release capsule: 1 orally 3 times a day with meals  PreserVision AREDS Lutein oral capsule: orally once a day  rivaroxaban 20 mg oral tablet: 1 tab(s) orally once a day  senna leaf extract oral tablet: 2 tab(s) orally once a day (at bedtime)

## 2023-12-05 NOTE — DISCHARGE NOTE PROVIDER - HOSPITAL COURSE
History of Present Illness:  83 year old Female with hx of afib on xarelto s/p ablation, macular degeneration, pancreatic enzyme deficiency, HTN,. HLD, GERD, pre-DM, c/o L hip pain s/p mechanical fall. Patient denies head hit or LOC. Patient denies numbness or tingling in the LLLE. Patient denies any other injuries. Pt was carrying boxes down stairs when she missed the last step causing her to fall. Since fall, patient has had exquisite tenderness in L hip. No other recent falls. Denies any chest pain, palpitations, dyspnea on exertion, lightheadedness or dizziness. Pt has had admission last in August where she came in with Afib with RVR. After that admission, patient had an ablation done at [facility] in the same month. Since ablation, patient has remained in sinus rhythm. Pt recently saw her cardiologist on 11/23 and was told that there were no issues. Patient is on xarelto, amiodarone, and metoprolol succinate for her Afib. She has not had any cardiac symptoms. She currently works, as excellent [employer], and is able to walk about 8 city blocks without getting tired. No previous complications with general anesthesia.     PAST MEDICAL & SURGICAL HISTORY:  HTN (hypertension)  HLD (hyperlipidemia)  GERD (gastroesophageal reflux disease)  Breast cancer  Macular degeneration  Exocrine pancreatic insufficiency  H/O left mastectomy  History of hysteroscopy  Status post right knee replacement    Hospital Course:  Patient admitted to [facility] for surgical fixation of a left hip fx on 12/3/23. Following medical clearance and optimization, and receiving pre-operative parenteral prophylactic antibiotics, the patient underwent an uncomplicated Left hip hemiarthroplasty. Patient tolerated the procedure well and was transferred to the recovery room in stable condition, with a stable neuro / vascular exam of the operated extremity.    Patient was placed back on home dosing of Xarelto 20mg for DVT ppx, and was placed on Protonix for GI protection.   Patient was made weight bearing as tolerated with the operative leg.     Patient evaluated by PT/OT and recommended for disposition for XXXXX    12/5/23: XXXXX    Discharge and Orthopedic Care instructions were delineated in the Discharge Plan and reviewed with the patient. All medications were delineated in the medication reconciliation tool and key points were reviewed with the patient. They were deemed stable from an Orthopedic & medical standpoint for discharge ***   History of Present Illness:  83 year old Female with hx of afib on xarelto s/p ablation, macular degeneration, pancreatic enzyme deficiency, HTN,. HLD, GERD, pre-DM, c/o L hip pain s/p mechanical fall. Patient denies head hit or LOC. Patient denies numbness or tingling in the LLLE. Patient denies any other injuries. Pt was carrying boxes down stairs when she missed the last step causing her to fall. Since fall, patient has had exquisite tenderness in L hip. No other recent falls. Denies any chest pain, palpitations, dyspnea on exertion, lightheadedness or dizziness. Pt has had admission last in August where she came in with Afib with RVR. After that admission, patient had an ablation done at [facility] in the same month. Since ablation, patient has remained in sinus rhythm. Pt recently saw her cardiologist on 11/23 and was told that there were no issues. Patient is on xarelto, amiodarone, and metoprolol succinate for her Afib. She has not had any cardiac symptoms. She currently works, as excellent [employer], and is able to walk about 8 city blocks without getting tired. No previous complications with general anesthesia.     PAST MEDICAL & SURGICAL HISTORY:  HTN (hypertension)  HLD (hyperlipidemia)  GERD (gastroesophageal reflux disease)  Breast cancer  Macular degeneration  Exocrine pancreatic insufficiency  H/O left mastectomy  History of hysteroscopy  Status post right knee replacement    Hospital Course:  Patient admitted to [facility] for surgical fixation of a left hip fx on 12/3/23. Following medical clearance and optimization, and receiving pre-operative parenteral prophylactic antibiotics, the patient underwent an uncomplicated Left hip hemiarthroplasty. Patient tolerated the procedure well and was transferred to the recovery room in stable condition, with a stable neuro / vascular exam of the operated extremity.    Patient was placed back on home dosing of Xarelto 20mg for DVT ppx, and was placed on Protonix for GI protection.   Patient was made weight bearing as tolerated with the operative leg.     Patient evaluated by PT/OT and recommended for disposition for XXXXX    12/5/23: XXXXX    Discharge and Orthopedic Care instructions were delineated in the Discharge Plan and reviewed with the patient. All medications were delineated in the medication reconciliation tool and key points were reviewed with the patient. They were deemed stable from an Orthopedic & medical standpoint for discharge ***   History of Present Illness:  83 year old Female with hx of afib on xarelto s/p ablation, macular degeneration, pancreatic enzyme deficiency, HTN,. HLD, GERD, pre-DM, c/o L hip pain s/p mechanical fall. Patient denies head hit or LOC. Patient denies numbness or tingling in the LLLE. Patient denies any other injuries. Pt was carrying boxes down stairs when she missed the last step causing her to fall. Since fall, patient has had exquisite tenderness in L hip. No other recent falls. Denies any chest pain, palpitations, dyspnea on exertion, lightheadedness or dizziness. Pt has had admission last in August where she came in with Afib with RVR. After that admission, patient had an ablation done at [facility] in the same month. Since ablation, patient has remained in sinus rhythm. Pt recently saw her cardiologist on 11/23 and was told that there were no issues. Patient is on xarelto, amiodarone, and metoprolol succinate for her Afib. She has not had any cardiac symptoms. She currently works, as excellent [employer], and is able to walk about 8 city blocks without getting tired. No previous complications with general anesthesia.     PAST MEDICAL & SURGICAL HISTORY:  HTN (hypertension)  HLD (hyperlipidemia)  GERD (gastroesophageal reflux disease)  Breast cancer  Macular degeneration  Exocrine pancreatic insufficiency  H/O left mastectomy  History of hysteroscopy  Status post right knee replacement    Hospital Course:  Patient admitted to [facility] for surgical fixation of a left hip fx on 12/3/23. Following medical clearance and optimization, and receiving pre-operative parenteral prophylactic antibiotics, the patient underwent an uncomplicated Left hip hemiarthroplasty. Patient tolerated the procedure well and was transferred to the recovery room in stable condition, with a stable neuro / vascular exam of the operated extremity.    Patient was placed back on home dosing of Xarelto 20mg for DVT ppx, and was placed on Protonix for GI protection.   Patient was made weight bearing as tolerated with the operative leg.     Patient evaluated by PT/OT and recommended for disposition for discharge to rehab facility on 12/6/2023      Discharge and Orthopedic Care instructions were delineated in the Discharge Plan and reviewed with the patient. All medications were delineated in the medication reconciliation tool and key points were reviewed with the patient. They were deemed stable from an Orthopedic & medical standpoint for discharge.   History of Present Illness:  83 year old Female with hx of afib on xarelto s/p ablation, macular degeneration, pancreatic enzyme deficiency, HTN,. HLD, GERD, pre-DM, c/o L hip pain s/p mechanical fall. Patient denies head hit or LOC. Patient denies numbness or tingling in the LLLE. Patient denies any other injuries. Pt was carrying boxes down stairs when she missed the last step causing her to fall. Since fall, patient has had exquisite tenderness in L hip. No other recent falls. Denies any chest pain, palpitations, dyspnea on exertion, lightheadedness or dizziness. Pt has had admission last in August where she came in with Afib with RVR. After that admission, patient had an ablation done at [facility] in the same month. Since ablation, patient has remained in sinus rhythm. Pt recently saw her cardiologist on 11/23 and was told that there were no issues. Patient is on xarelto, amiodarone, and metoprolol succinate for her Afib. She has not had any cardiac symptoms. She currently works, as excellent [employer]s, and is able to walk about 8 city blocks without getting tired. No previous complications with general anesthesia.     PAST MEDICAL & SURGICAL HISTORY:  HTN (hypertension)  HLD (hyperlipidemia)  GERD (gastroesophageal reflux disease)  Breast cancer  Macular degeneration  Exocrine pancreatic insufficiency  H/O left mastectomy  History of hysteroscopy  Status post right knee replacement    Hospital Course:  Patient admitted to [facility] for surgical fixation of a left hip fx on 12/3/23. Following medical clearance and optimization, and receiving pre-operative parenteral prophylactic antibiotics, the patient underwent an uncomplicated Left hip hemiarthroplasty. Patient tolerated the procedure well and was transferred to the recovery room in stable condition, with a stable neuro / vascular exam of the operated extremity.    Patient was placed back on home dosing of Xarelto 20mg for DVT ppx, and was placed on Protonix for GI protection.   Patient was made weight bearing as tolerated with the operative leg.     Patient evaluated by PT/OT and recommended for disposition for discharge to rehab facility on 12/6/2023      Discharge and Orthopedic Care instructions were delineated in the Discharge Plan and reviewed with the patient. All medications were delineated in the medication reconciliation tool and key points were reviewed with the patient. They were deemed stable from an Orthopedic & medical standpoint for discharge.   History of Present Illness:  83 year old Female with hx of afib on xarelto s/p ablation, macular degeneration, pancreatic enzyme deficiency, HTN,. HLD, GERD, pre-DM, c/o L hip pain s/p mechanical fall. Patient denies head hit or LOC. Patient denies numbness or tingling in the LLLE. Patient denies any other injuries. Pt was carrying boxes down stairs when she missed the last step causing her to fall. Since fall, patient has had exquisite tenderness in L hip. No other recent falls. Denies any chest pain, palpitations, dyspnea on exertion, lightheadedness or dizziness. Pt has had admission last in August where she came in with Afib with RVR. After that admission, patient had an ablation done at [facility] in the same month. Since ablation, patient has remained in sinus rhythm. Pt recently saw her cardiologist on 11/23 and was told that there were no issues. Patient is on xarelto, amiodarone, and metoprolol succinate for her Afib. She has not had any cardiac symptoms. She currently works, as excellent METs, and is able to walk about 8 city blocks without getting tired. No previous complications with general anesthesia.     PAST MEDICAL & SURGICAL HISTORY:  HTN (hypertension)  HLD (hyperlipidemia)  GERD (gastroesophageal reflux disease)  Breast cancer  Macular degeneration  Exocrine pancreatic insufficiency  H/O left mastectomy  History of hysteroscopy  Status post right knee replacement    Hospital Course:  Patient admitted to [facility] for surgical fixation of a left hip fx on 12/3/23. Following medical clearance and optimization, and receiving pre-operative parenteral prophylactic antibiotics, the patient underwent an uncomplicated Left hip hemiarthroplasty. Patient tolerated the procedure well and was transferred to the recovery room in stable condition, with a stable neuro / vascular exam of the operated extremity. Patient was found to have non-displaced left 5th metatarsal fracture and was placed in rocker boot and allowed full weight bearing.    Patient was placed back on home dosing of Xarelto 20mg for DVT ppx, and was placed on Protonix for GI protection.   Patient was made weight bearing as tolerated with the operative leg.     Patient evaluated by PT/OT and recommended for disposition for discharge to rehab facility on 12/6/2023      Discharge and Orthopedic Care instructions were delineated in the Discharge Plan and reviewed with the patient. All medications were delineated in the medication reconciliation tool and key points were reviewed with the patient. They were deemed stable from an Orthopedic & medical standpoint for discharge.   History of Present Illness:  83 year old Female with hx of afib on xarelto s/p ablation, macular degeneration, pancreatic enzyme deficiency, HTN,. HLD, GERD, pre-DM, c/o L hip pain s/p mechanical fall. Patient denies head hit or LOC. Patient denies numbness or tingling in the LLLE. Patient denies any other injuries. Pt was carrying boxes down stairs when she missed the last step causing her to fall. Since fall, patient has had exquisite tenderness in L hip. No other recent falls. Denies any chest pain, palpitations, dyspnea on exertion, lightheadedness or dizziness. Pt has had admission last in August where she came in with Afib with RVR. After that admission, patient had an ablation done at [facility] in the same month. Since ablation, patient has remained in sinus rhythm. Pt recently saw her cardiologist on 11/23 and was told that there were no issues. Patient is on xarelto, amiodarone, and metoprolol succinate for her Afib. She has not had any cardiac symptoms. She currently works, as excellent METs, and is able to walk about 8 city blocks without getting tired. No previous complications with general anesthesia.     PAST MEDICAL & SURGICAL HISTORY:  HTN (hypertension)  HLD (hyperlipidemia)  GERD (gastroesophageal reflux disease)  Breast cancer  Macular degeneration  Exocrine pancreatic insufficiency  H/O left mastectomy  History of hysteroscopy  Status post right knee replacement    Hospital Course:  Patient admitted to [facility] for surgical fixation of a left hip fx on 12/3/23. Following medical clearance and optimization, and receiving pre-operative parenteral prophylactic antibiotics, the patient underwent an uncomplicated Left hip hemiarthroplasty. Patient tolerated the procedure well and was transferred to the recovery room in stable condition, with a stable neuro / vascular exam of the operated extremity. Patient was found to have non-displaced left 5th metatarsal fracture and was placed in rocker boot and allowed full weight bearing.    Patient was placed back on home dosing of Xarelto 20mg for DVT ppx, and was placed on Protonix for GI protection.   Patient was made weight bearing as tolerated with the operative leg.     Patient evaluated by PT/OT and recommended for disposition for discharge to rehab facility on 12/6/2023      Discharge and Orthopedic Care instructions were delineated in the Discharge Plan and reviewed with the patient. All medications were delineated in the medication reconciliation tool and key points were reviewed with the patient. They were deemed stable from an Orthopedic & medical standpoint for discharge.

## 2023-12-05 NOTE — DISCHARGE NOTE PROVIDER - CARE PROVIDER_API CALL
Maciel Adames  Orthopaedic Trauma  [address], [address]  [place] [address]  Phone: [phone]  Fax: [phone]  Follow Up Time: 2 weeks   Maciel Adames  Orthopaedic Trauma  [address], [address]  [place] [address]  Phone: [phone]  Fax: [phone]  Follow Up Time: 2 weeks

## 2023-12-05 NOTE — OCCUPATIONAL THERAPY INITIAL EVALUATION ADULT - RANGE OF MOTION EXAMINATION, LOWER EXTREMITY
L knee and ankle WFL, hip not tested due to pain/Right LE Active ROM was WFL   (within functional limits)

## 2023-12-05 NOTE — PROGRESS NOTE ADULT - SUBJECTIVE AND OBJECTIVE BOX
Orthopaedic Surgery Progress Note    Subjective:   Patient seen and examined. No acute events overnight. States pain is controlled. Denies fever/chills/chest pain/shortness of breath/numbness/tingling.    Objective:  T(C): 36.5 (12-05-23 @ 04:41), Max: 37.2 (12-04-23 @ 08:10)  HR: 60 (12-05-23 @ 04:41) (53 - 72)  BP: 139/69 (12-05-23 @ 04:41) (126/62 - 172/65)  RR: 18 (12-05-23 @ 04:41) (16 - 18)  SpO2: 98% (12-05-23 @ 04:41) (92% - 100%)  Wt(kg): --    12-03 @ 07:01  -  12-04 @ 07:00  --------------------------------------------------------  IN: 0 mL / OUT: 0 mL / NET: 0 mL    12-04 @ 07:01  -  12-05 @ 06:49  --------------------------------------------------------  IN: 500 mL / OUT: 650 mL / NET: -150 mL        Physical Exam:    Gen: awake, alert, NAD  Resp: no increased work of breathing  LLE:  Aquacel c/d/i   +EHL/FHL/TA/GS  SILT S/S/SP/DP  +DP/PT Pulses  Compartments soft  No calf TTP                           9.0    9.34  )-----------( 218      ( 05 Dec 2023 06:24 )             27.0     12-04    139  |  106  |  16  ----------------------------<  139<H>  4.3   |  23  |  1.13    Ca    8.4      04 Dec 2023 17:35    TPro  7.3  /  Alb  4.1  /  TBili  0.3  /  DBili  x   /  AST  31  /  ALT  23  /  AlkPhos  66  12-03    PT/INR - ( 04 Dec 2023 01:03 )   PT: 11.4 sec;   INR: 1.04 ratio         PTT - ( 04 Dec 2023 01:03 )  PTT:23.1 sec  Urinalysis Basic - ( 04 Dec 2023 17:35 )    Color: x / Appearance: x / SG: x / pH: x  Gluc: 139 mg/dL / Ketone: x  / Bili: x / Urobili: x   Blood: x / Protein: x / Nitrite: x   Leuk Esterase: x / RBC: x / WBC x   Sq Epi: x / Non Sq Epi: x / Bacteria: x

## 2023-12-05 NOTE — DISCHARGE NOTE PROVIDER - DISCHARGE SERVICE FOR PATIENT
4 = No assist / stand by assistance on the discharge service for the patient. I have reviewed and made amendments to the documentation where necessary.

## 2023-12-05 NOTE — DISCHARGE NOTE PROVIDER - PROVIDER TOKENS
PROVIDER:[TOKEN:[[id number]],FOLLOWUP:[2 weeks]] PROVIDER:[TOKEN:[[id number]],FOLLOWUP:[2 weeks]]

## 2023-12-05 NOTE — PHYSICAL THERAPY INITIAL EVALUATION ADULT - GENERAL OBSERVATIONS, REHAB EVAL
Pt rec'd semisupine in bed, +NC x 2L, +continuous pulse oximeter, +IVL,  in NAD.  Pt cleared for PT session by MOOK Reynoso.

## 2023-12-06 ENCOUNTER — TRANSCRIPTION ENCOUNTER (OUTPATIENT)
Age: 83
End: 2023-12-06

## 2023-12-06 VITALS
TEMPERATURE: 99 F | DIASTOLIC BLOOD PRESSURE: 67 MMHG | OXYGEN SATURATION: 92 % | HEART RATE: 68 BPM | RESPIRATION RATE: 18 BRPM | SYSTOLIC BLOOD PRESSURE: 136 MMHG

## 2023-12-06 LAB
ANION GAP SERPL CALC-SCNC: 13 MMOL/L — SIGNIFICANT CHANGE UP (ref 5–17)
ANION GAP SERPL CALC-SCNC: 13 MMOL/L — SIGNIFICANT CHANGE UP (ref 5–17)
BUN SERPL-MCNC: 19 MG/DL — SIGNIFICANT CHANGE UP (ref 7–23)
BUN SERPL-MCNC: 19 MG/DL — SIGNIFICANT CHANGE UP (ref 7–23)
CALCIUM SERPL-MCNC: 9.2 MG/DL — SIGNIFICANT CHANGE UP (ref 8.4–10.5)
CALCIUM SERPL-MCNC: 9.2 MG/DL — SIGNIFICANT CHANGE UP (ref 8.4–10.5)
CHLORIDE SERPL-SCNC: 101 MMOL/L — SIGNIFICANT CHANGE UP (ref 96–108)
CHLORIDE SERPL-SCNC: 101 MMOL/L — SIGNIFICANT CHANGE UP (ref 96–108)
CO2 SERPL-SCNC: 24 MMOL/L — SIGNIFICANT CHANGE UP (ref 22–31)
CO2 SERPL-SCNC: 24 MMOL/L — SIGNIFICANT CHANGE UP (ref 22–31)
CREAT SERPL-MCNC: 1.16 MG/DL — SIGNIFICANT CHANGE UP (ref 0.5–1.3)
CREAT SERPL-MCNC: 1.16 MG/DL — SIGNIFICANT CHANGE UP (ref 0.5–1.3)
EGFR: 47 ML/MIN/1.73M2 — LOW
EGFR: 47 ML/MIN/1.73M2 — LOW
GLUCOSE SERPL-MCNC: 118 MG/DL — HIGH (ref 70–99)
GLUCOSE SERPL-MCNC: 118 MG/DL — HIGH (ref 70–99)
HCT VFR BLD CALC: 27.4 % — LOW (ref 34.5–45)
HCT VFR BLD CALC: 27.4 % — LOW (ref 34.5–45)
HGB BLD-MCNC: 9.2 G/DL — LOW (ref 11.5–15.5)
HGB BLD-MCNC: 9.2 G/DL — LOW (ref 11.5–15.5)
MCHC RBC-ENTMCNC: 31.3 PG — SIGNIFICANT CHANGE UP (ref 27–34)
MCHC RBC-ENTMCNC: 31.3 PG — SIGNIFICANT CHANGE UP (ref 27–34)
MCHC RBC-ENTMCNC: 33.6 GM/DL — SIGNIFICANT CHANGE UP (ref 32–36)
MCHC RBC-ENTMCNC: 33.6 GM/DL — SIGNIFICANT CHANGE UP (ref 32–36)
MCV RBC AUTO: 93.2 FL — SIGNIFICANT CHANGE UP (ref 80–100)
MCV RBC AUTO: 93.2 FL — SIGNIFICANT CHANGE UP (ref 80–100)
NRBC # BLD: 0 /100 WBCS — SIGNIFICANT CHANGE UP (ref 0–0)
NRBC # BLD: 0 /100 WBCS — SIGNIFICANT CHANGE UP (ref 0–0)
PLATELET # BLD AUTO: 234 K/UL — SIGNIFICANT CHANGE UP (ref 150–400)
PLATELET # BLD AUTO: 234 K/UL — SIGNIFICANT CHANGE UP (ref 150–400)
POTASSIUM SERPL-MCNC: 4.1 MMOL/L — SIGNIFICANT CHANGE UP (ref 3.5–5.3)
POTASSIUM SERPL-MCNC: 4.1 MMOL/L — SIGNIFICANT CHANGE UP (ref 3.5–5.3)
POTASSIUM SERPL-SCNC: 4.1 MMOL/L — SIGNIFICANT CHANGE UP (ref 3.5–5.3)
POTASSIUM SERPL-SCNC: 4.1 MMOL/L — SIGNIFICANT CHANGE UP (ref 3.5–5.3)
RBC # BLD: 2.94 M/UL — LOW (ref 3.8–5.2)
RBC # BLD: 2.94 M/UL — LOW (ref 3.8–5.2)
RBC # FLD: 14.2 % — SIGNIFICANT CHANGE UP (ref 10.3–14.5)
RBC # FLD: 14.2 % — SIGNIFICANT CHANGE UP (ref 10.3–14.5)
SARS-COV-2 RNA SPEC QL NAA+PROBE: SIGNIFICANT CHANGE UP
SARS-COV-2 RNA SPEC QL NAA+PROBE: SIGNIFICANT CHANGE UP
SODIUM SERPL-SCNC: 138 MMOL/L — SIGNIFICANT CHANGE UP (ref 135–145)
SODIUM SERPL-SCNC: 138 MMOL/L — SIGNIFICANT CHANGE UP (ref 135–145)
WBC # BLD: 10.67 K/UL — HIGH (ref 3.8–10.5)
WBC # BLD: 10.67 K/UL — HIGH (ref 3.8–10.5)
WBC # FLD AUTO: 10.67 K/UL — HIGH (ref 3.8–10.5)
WBC # FLD AUTO: 10.67 K/UL — HIGH (ref 3.8–10.5)

## 2023-12-06 PROCEDURE — 73630 X-RAY EXAM OF FOOT: CPT | Mod: 26,LT

## 2023-12-06 RX ORDER — ACETAMINOPHEN 500 MG
3 TABLET ORAL
Qty: 0 | Refills: 0 | DISCHARGE
Start: 2023-12-06

## 2023-12-06 RX ORDER — ATORVASTATIN CALCIUM 80 MG/1
1 TABLET, FILM COATED ORAL
Qty: 0 | Refills: 0 | DISCHARGE

## 2023-12-06 RX ORDER — RIVAROXABAN 15 MG-20MG
1 KIT ORAL
Refills: 0 | DISCHARGE

## 2023-12-06 RX ORDER — RIVAROXABAN 15 MG-20MG
1 KIT ORAL
Qty: 0 | Refills: 0 | DISCHARGE
Start: 2023-12-06

## 2023-12-06 RX ORDER — OXYCODONE HYDROCHLORIDE 5 MG/1
1 TABLET ORAL
Qty: 0 | Refills: 0 | DISCHARGE
Start: 2023-12-06

## 2023-12-06 RX ADMIN — Medication 1 TABLET(S): at 05:04

## 2023-12-06 RX ADMIN — OXYCODONE HYDROCHLORIDE 10 MILLIGRAM(S): 5 TABLET ORAL at 09:24

## 2023-12-06 RX ADMIN — PANTOPRAZOLE SODIUM 40 MILLIGRAM(S): 20 TABLET, DELAYED RELEASE ORAL at 05:09

## 2023-12-06 RX ADMIN — LISINOPRIL 10 MILLIGRAM(S): 2.5 TABLET ORAL at 05:04

## 2023-12-06 RX ADMIN — OXYCODONE HYDROCHLORIDE 10 MILLIGRAM(S): 5 TABLET ORAL at 06:04

## 2023-12-06 RX ADMIN — AMIODARONE HYDROCHLORIDE 200 MILLIGRAM(S): 400 TABLET ORAL at 05:04

## 2023-12-06 RX ADMIN — Medication 1 CAPSULE(S): at 08:45

## 2023-12-06 RX ADMIN — RIVAROXABAN 20 MILLIGRAM(S): KIT at 11:15

## 2023-12-06 RX ADMIN — ESCITALOPRAM OXALATE 10 MILLIGRAM(S): 10 TABLET, FILM COATED ORAL at 11:15

## 2023-12-06 RX ADMIN — OXYCODONE HYDROCHLORIDE 10 MILLIGRAM(S): 5 TABLET ORAL at 15:11

## 2023-12-06 RX ADMIN — OXYCODONE HYDROCHLORIDE 10 MILLIGRAM(S): 5 TABLET ORAL at 10:24

## 2023-12-06 RX ADMIN — Medication 1 CAPSULE(S): at 12:10

## 2023-12-06 RX ADMIN — Medication 1 TABLET(S): at 11:16

## 2023-12-06 RX ADMIN — OXYCODONE HYDROCHLORIDE 10 MILLIGRAM(S): 5 TABLET ORAL at 05:04

## 2023-12-06 RX ADMIN — Medication 50 MILLIGRAM(S): at 05:04

## 2023-12-06 NOTE — DISCHARGE NOTE NURSING/CASE MANAGEMENT/SOCIAL WORK - PATIENT PORTAL LINK FT
You can access the FollowMyHealth Patient Portal offered by [facility] by registering at the following website: [email or web address]. By joining [facility]’s FollowMyHealth portal, you will also be able to view your health information using other applications (apps) compatible with our system. You can access the FollowMyHealth Patient Portal offered by [facility] by registering at the following website: [email or web address]. By joining [facility]’s FollowMyHealth portal, you will also be able to view your health information using other applications (apps) compatible with our system.

## 2023-12-06 NOTE — PROGRESS NOTE ADULT - ASSESSMENT
83y Female POD #2 s/p Hemiarthroplasty of left hip  -PT/OT-WBAT- posterior precautions  -Chk AM Labs  -DVT PPx: Xarelto 20mg QD     -- currently dose confirmed w/ patient     -- No longer takes Eliquis or Baby ASA  -Pain Control PO/IV Pain Rx  -Continue Current Tx  -Dispo planning: СВЕТЛАНА
83y Female POD #1 s/p Hemiarthroplasty of left hip  -PT/OT-WBAT- posterior precautions  -Chk AM Labs  -DVT PPx: Xarelto 20mg QD     -- currently dose confirmed w/ patient     -- No longer takes Eliquis or Baby ASA  -Pain Control PO/IV Pain Rx  -Continue Current Tx  -Dispo planning: СВЕТЛАНА

## 2023-12-06 NOTE — PROGRESS NOTE ADULT - SUBJECTIVE AND OBJECTIVE BOX
Orthopaedic Surgery Progress Note    Subjective:   Patient seen and examined. No acute events overnight. States pain is controlled. Denies fever/chills/chest pain/shortness of breath/numbness/tingling.    Objective:  T(C): 37.3 (12-06-23 @ 04:37), Max: 37.3 (12-06-23 @ 04:37)  HR: 70 (12-06-23 @ 04:37) (53 - 70)  BP: 158/68 (12-06-23 @ 04:37) (100/59 - 158/68)  RR: 18 (12-06-23 @ 04:37) (18 - 18)  SpO2: 94% (12-06-23 @ 04:37) (94% - 100%)  Wt(kg): --    12-04 @ 07:01  -  12-05 @ 07:00  --------------------------------------------------------  IN: 500 mL / OUT: 650 mL / NET: -150 mL    12-05 @ 07:01  -  12-06 @ 06:53  --------------------------------------------------------  IN: 880 mL / OUT: 2500 mL / NET: -1620 mL      Physical Exam:    Gen: awake, alert, NAD  Resp: no increased work of breathing  LLE:  Aquacel c/d/i with mild proximal strikethrough  +EHL/FHL/TA/GS  SILT S/S/SP/DP  +DP/PT Pulses  Compartments soft  No calf TTP                           9.0    9.34  )-----------( 218      ( 05 Dec 2023 06:24 )             27.0     12-05    138  |  104  |  16  ----------------------------<  150<H>  4.7   |  24  |  1.07    Ca    8.6      05 Dec 2023 06:24        Urinalysis Basic - ( 05 Dec 2023 06:24 )    Color: x / Appearance: x / SG: x / pH: x  Gluc: 150 mg/dL / Ketone: x  / Bili: x / Urobili: x   Blood: x / Protein: x / Nitrite: x   Leuk Esterase: x / RBC: x / WBC x   Sq Epi: x / Non Sq Epi: x / Bacteria: x

## 2023-12-06 NOTE — PROGRESS NOTE ADULT - SUBJECTIVE AND OBJECTIVE BOX
Patient 83 Y female evaluated measured fitted for left rocker shoe   to aid in treatment of post R metatarsal fracture to allow weight bearing  and walking assist in healing ordered by Orthopedics   delivered by [facility] [phone]   Patient 83 Y female evaluated measured fitted for left rocker shoe   to aid in treatment of post R metatarsal fracture to allow weight bearing  and walking assist in healing ordered by Orthopedics   delivered by [facility] [phone]

## 2023-12-06 NOTE — DISCHARGE NOTE NURSING/CASE MANAGEMENT/SOCIAL WORK - NSDCPEFALRISK_GEN_ALL_CORE
For information on Fall & Injury Prevention, visit: [email or web address] OR  [email or web address] OR  https://www.cdc.gov/steadi/patient.html For information on Fall & Injury Prevention, visit: [email or web address] OR  [email or web address] OR  https://www.cdc.gov/steadi/patient.html

## 2023-12-08 ENCOUNTER — TRANSCRIPTION ENCOUNTER (OUTPATIENT)
Age: 83
End: 2023-12-08

## 2023-12-13 ENCOUNTER — TRANSCRIPTION ENCOUNTER (OUTPATIENT)
Age: 83
End: 2023-12-13

## 2023-12-13 PROCEDURE — 73552 X-RAY EXAM OF FEMUR 2/>: CPT

## 2023-12-13 PROCEDURE — 87635 SARS-COV-2 COVID-19 AMP PRB: CPT

## 2023-12-13 PROCEDURE — 84295 ASSAY OF SERUM SODIUM: CPT

## 2023-12-13 PROCEDURE — 72170 X-RAY EXAM OF PELVIS: CPT

## 2023-12-13 PROCEDURE — 73502 X-RAY EXAM HIP UNI 2-3 VIEWS: CPT

## 2023-12-13 PROCEDURE — 85018 HEMOGLOBIN: CPT

## 2023-12-13 PROCEDURE — 82330 ASSAY OF CALCIUM: CPT

## 2023-12-13 PROCEDURE — C1776: CPT

## 2023-12-13 PROCEDURE — 81003 URINALYSIS AUTO W/O SCOPE: CPT

## 2023-12-13 PROCEDURE — 82435 ASSAY OF BLOOD CHLORIDE: CPT

## 2023-12-13 PROCEDURE — 99285 EMERGENCY DEPT VISIT HI MDM: CPT

## 2023-12-13 PROCEDURE — 85025 COMPLETE CBC W/AUTO DIFF WBC: CPT

## 2023-12-13 PROCEDURE — 97110 THERAPEUTIC EXERCISES: CPT

## 2023-12-13 PROCEDURE — 71045 X-RAY EXAM CHEST 1 VIEW: CPT

## 2023-12-13 PROCEDURE — 80048 BASIC METABOLIC PNL TOTAL CA: CPT

## 2023-12-13 PROCEDURE — 80053 COMPREHEN METABOLIC PANEL: CPT

## 2023-12-13 PROCEDURE — 97162 PT EVAL MOD COMPLEX 30 MIN: CPT

## 2023-12-13 PROCEDURE — 73501 X-RAY EXAM HIP UNI 1 VIEW: CPT

## 2023-12-13 PROCEDURE — 82947 ASSAY GLUCOSE BLOOD QUANT: CPT

## 2023-12-13 PROCEDURE — 86850 RBC ANTIBODY SCREEN: CPT

## 2023-12-13 PROCEDURE — 85027 COMPLETE CBC AUTOMATED: CPT

## 2023-12-13 PROCEDURE — 36415 COLL VENOUS BLD VENIPUNCTURE: CPT

## 2023-12-13 PROCEDURE — 86901 BLOOD TYPING SEROLOGIC RH(D): CPT

## 2023-12-13 PROCEDURE — 86900 BLOOD TYPING SEROLOGIC ABO: CPT

## 2023-12-13 PROCEDURE — 73630 X-RAY EXAM OF FOOT: CPT

## 2023-12-13 PROCEDURE — 83605 ASSAY OF LACTIC ACID: CPT

## 2023-12-13 PROCEDURE — 97530 THERAPEUTIC ACTIVITIES: CPT

## 2023-12-13 PROCEDURE — 85610 PROTHROMBIN TIME: CPT

## 2023-12-13 PROCEDURE — 85730 THROMBOPLASTIN TIME PARTIAL: CPT

## 2023-12-13 PROCEDURE — 97165 OT EVAL LOW COMPLEX 30 MIN: CPT

## 2023-12-13 PROCEDURE — C9399: CPT

## 2023-12-13 PROCEDURE — 85014 HEMATOCRIT: CPT

## 2023-12-13 PROCEDURE — 82803 BLOOD GASES ANY COMBINATION: CPT

## 2023-12-13 PROCEDURE — 73562 X-RAY EXAM OF KNEE 3: CPT

## 2023-12-13 PROCEDURE — 84132 ASSAY OF SERUM POTASSIUM: CPT

## 2024-01-08 PROBLEM — Z00.00 ENCOUNTER FOR PREVENTIVE HEALTH EXAMINATION: Status: ACTIVE | Noted: 2024-01-08

## 2024-01-10 ENCOUNTER — APPOINTMENT (OUTPATIENT)
Dept: ORTHOPEDIC SURGERY | Facility: CLINIC | Age: 84
End: 2024-01-10

## 2024-01-18 ENCOUNTER — TRANSCRIPTION ENCOUNTER (OUTPATIENT)
Age: 84
End: 2024-01-18

## 2024-01-31 ENCOUNTER — TRANSCRIPTION ENCOUNTER (OUTPATIENT)
Age: 84
End: 2024-01-31

## 2024-02-14 ENCOUNTER — APPOINTMENT (OUTPATIENT)
Dept: ORTHOPEDIC SURGERY | Facility: CLINIC | Age: 84
End: 2024-02-14

## 2024-02-29 ENCOUNTER — APPOINTMENT (OUTPATIENT)
Dept: ORTHOPEDIC SURGERY | Facility: CLINIC | Age: 84
End: 2024-02-29
Payer: MEDICARE

## 2024-02-29 PROCEDURE — 73502 X-RAY EXAM HIP UNI 2-3 VIEWS: CPT

## 2024-02-29 PROCEDURE — 99024 POSTOP FOLLOW-UP VISIT: CPT

## 2024-03-06 NOTE — HISTORY OF PRESENT ILLNESS
[de-identified] : s/p Left Press-Fit bipolar hemiarthroplasty, DOS: 12/4/23 [de-identified] : Ms. MADI COLMENARES is a 83 year old woman presents today for post-op appointment s/p left press-fit bipolar hemiarthroplasty on 12/4/23. Since her surgery she states she is feeling better. She has been participating in PT at a rehab facility and at home since the surgery. She is ambulating with a cane. She is very happy with her progress. She has mild soreness at the left hip after prolonged walking. [de-identified] : The patient is sitting comfortably in the exam room.  LEFT hip -Skin is intact, no swelling, no ecchymosis -incisions well-healed, no erythema, no signs of infection -No tenderness to palpation over the greater trochanter -Painless range of motion hip -Sensation is intact L1-S1 -5/5 EHL, FHL, TA, GS, quadriceps, hamstrings -Foot is warm and well-perfused, palpable dorsalis pedis pulse  [de-identified] :  X-rays of the left hip and AP pelvis were taken in the office today, 2/29/24. X-rays include AP and lateral of the hip.  X-rays show good overall alignment of the pelvis and left hip.  No subsidence of the femoral component.  The femoral head is well centered in the acetabulum. [de-identified] : 83-year-old woman s/p left press-fit bipolar hemiarthroplasty, nearly 3 months out. [de-identified] : -X-ray and physical exam findings were discussed with the patient -Weightbearing as tolerated left LE -Demonstrated safe ways for the patient to put on her shoes/sock and get up from a sitting position.  -Follow up with cardiologist/PCP to make sure she can continue with PT -Physical therapy: work on gait training and strengthening. Patient can call for the PT to be renewed. -Follow up in 3 months with x-rays of the left hip and AP pelvis at that time. -All the patient's questions and concerns were addressed during this visit

## 2024-05-30 ENCOUNTER — APPOINTMENT (OUTPATIENT)
Dept: ORTHOPEDIC SURGERY | Facility: CLINIC | Age: 84
End: 2024-05-30
Payer: MEDICARE

## 2024-05-30 DIAGNOSIS — S72.002A FRACTURE OF UNSPECIFIED PART OF NECK OF LEFT FEMUR, INITIAL ENCOUNTER FOR CLOSED FRACTURE: ICD-10-CM

## 2024-05-30 PROCEDURE — 99213 OFFICE O/P EST LOW 20 MIN: CPT

## 2024-05-30 PROCEDURE — 73502 X-RAY EXAM HIP UNI 2-3 VIEWS: CPT

## 2024-05-30 NOTE — PHYSICAL EXAM
[de-identified] : The patient is sitting comfortably in the exam room. LEFT hip -Skin is intact, no swelling, no ecchymosis -incisions well-healed, no erythema, no signs of infection -No tenderness to palpation over the greater trochanter -Painless range of motion hip -Sensation is intact L1-S1 -5/5 EHL, FHL, TA, GS, quadriceps, hamstrings -Foot is warm and well-perfused, palpable dorsalis pedis pulse. [de-identified] : X-rays of the left hip and AP pelvis were taken in the office today, 5/30/24. X-rays include AP and lateral of the hip. X-rays show good overall alignment of the pelvis and left hip. No subsidence of the femoral component. The femoral head is well centered in the acetabulum.

## 2024-05-30 NOTE — HISTORY OF PRESENT ILLNESS
[de-identified] : Ms. 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 Since her last visit she states she is feeling better. She ambulates with a cane when outside her home.  She does not rely on the cane but carries it with her.  She is very happy with her care and progress.

## 2024-05-30 NOTE — DISCUSSION/SUMMARY
[de-identified] : 83-year-old woman s/p left press-fit bipolar hemiarthroplasty, nearly 6 months out.  -X-ray and physical exam findings were discussed with the patient -Weightbearing as tolerated left LE -Physical therapy: work on gait training and strengthening. Patient can call for the PT to be renewed. -Follow up in 6 months with x-rays of the left hip and AP pelvis at that time. -All the patient's questions and concerns were addressed during this visit.

## 2024-05-30 NOTE — REASON FOR VISIT
[Follow-Up Visit] : a follow-up visit for [FreeTextEntry2] : s/p Left Press-Fit bipolar hemiarthroplasty, DOS: 12/4/23.

## 2024-12-04 NOTE — DISCHARGE NOTE NURSING/CASE MANAGEMENT/SOCIAL WORK - NSDCVIVACCINE_GEN_ALL_CORE_FT
Mom calls to say Invega wasn't ready at the pharmacy for today. Asking to come on Friday.    No Vaccines Administered.

## 2025-01-08 ENCOUNTER — APPOINTMENT (OUTPATIENT)
Dept: ORTHOPEDIC SURGERY | Facility: CLINIC | Age: 85
End: 2025-01-08

## 2025-01-08 ENCOUNTER — NON-APPOINTMENT (OUTPATIENT)
Age: 85
End: 2025-01-08

## 2025-01-08 DIAGNOSIS — S72.002A FRACTURE OF UNSPECIFIED PART OF NECK OF LEFT FEMUR, INITIAL ENCOUNTER FOR CLOSED FRACTURE: ICD-10-CM

## 2025-01-08 PROCEDURE — 73502 X-RAY EXAM HIP UNI 2-3 VIEWS: CPT

## 2025-01-08 PROCEDURE — 99213 OFFICE O/P EST LOW 20 MIN: CPT

## (undated) DEVICE — SUT POLYSORB 0 30" GS-21 UNDYED

## (undated) DEVICE — HOOD FLYTE STRYKER HELMET SHIELD

## (undated) DEVICE — SAW BLADE STRYKER SAGITTAL 25X1.27X90

## (undated) DEVICE — DRAPE HIP W POUCHES 87X115X134"

## (undated) DEVICE — SAW BLADE STRYKER SAGITTAL 3 HOLE OSCILLATING

## (undated) DEVICE — DRAPE U 47X51" NON STERILE

## (undated) DEVICE — SOL IRR POUR H2O 1000ML

## (undated) DEVICE — SUT RETRIEVER S&N LAVENDER

## (undated) DEVICE — DRSG AQUACEL 3.5 X 10"

## (undated) DEVICE — SUT ETHIBOND EXCEL 2 30" OS-4

## (undated) DEVICE — PACK TOTAL HIP (2 PACKS)

## (undated) DEVICE — NDL HYPO SAFE 22G X 1.5" (BLACK)

## (undated) DEVICE — Device

## (undated) DEVICE — POSITIONER FOAM ABDUCTION PILLOW MED (PINK)

## (undated) DEVICE — SOL IRR POUR NS 0.9% 500ML

## (undated) DEVICE — TUBING SUCTION 20FT

## (undated) DEVICE — PRESSURIZER FEM CNL W/O HUB MED

## (undated) DEVICE — DRSG DERMABOND 0.7ML

## (undated) DEVICE — GLV 7.5 PROTEXIS (WHITE)

## (undated) DEVICE — SUT MONOCRYL 3-0 18" PS-2 UNDYED

## (undated) DEVICE — DRAPE IOBAN 33" X 23"

## (undated) DEVICE — SPECIMEN CONTAINER 100ML

## (undated) DEVICE — GLV 8.5 PROTEXIS (WHITE)

## (undated) DEVICE — SUT PDO 2 1/2 CIRCLE 40MM NDL 45CM

## (undated) DEVICE — SYR LUER LOK 20CC

## (undated) DEVICE — SOL IRR BAG NS 0.9% 3000ML

## (undated) DEVICE — SUT POLYSORB 2-0 30" GS-21 UNDYED

## (undated) DEVICE — ELCTR BOVIE PENCIL SMOKE EVACUATION

## (undated) DEVICE — DRAPE 3/4 SHEET W REINFORCEMENT 56X77"

## (undated) DEVICE — VENODYNE/SCD SLEEVE CALF LARGE

## (undated) DEVICE — STRYKER INTERPULSE HANDPIECE W IRR SUCTION TUBE

## (undated) DEVICE — GLV 8 PROTEXIS (WHITE)

## (undated) DEVICE — WARMING BLANKET UPPER ADULT

## (undated) DEVICE — WOUND IRR SURGIPHOR